# Patient Record
Sex: FEMALE | Race: BLACK OR AFRICAN AMERICAN | NOT HISPANIC OR LATINO | Employment: FULL TIME | URBAN - METROPOLITAN AREA
[De-identification: names, ages, dates, MRNs, and addresses within clinical notes are randomized per-mention and may not be internally consistent; named-entity substitution may affect disease eponyms.]

---

## 2017-01-14 ENCOUNTER — HOSPITAL ENCOUNTER (EMERGENCY)
Facility: HOSPITAL | Age: 22
Discharge: HOME | End: 2017-01-14
Attending: EMERGENCY MEDICINE
Payer: MEDICAID

## 2017-01-14 VITALS
BODY MASS INDEX: 47.09 KG/M2 | TEMPERATURE: 98 F | OXYGEN SATURATION: 98 % | DIASTOLIC BLOOD PRESSURE: 65 MMHG | RESPIRATION RATE: 16 BRPM | SYSTOLIC BLOOD PRESSURE: 115 MMHG | HEART RATE: 89 BPM | HEIGHT: 66 IN | WEIGHT: 293 LBS

## 2017-01-14 DIAGNOSIS — K80.20 CALCULUS OF GALLBLADDER WITHOUT CHOLECYSTITIS WITHOUT OBSTRUCTION: Primary | ICD-10-CM

## 2017-01-14 LAB
ALBUMIN SERPL BCP-MCNC: 3.7 G/DL
ALP SERPL-CCNC: 150 U/L
ALT SERPL W/O P-5'-P-CCNC: 42 U/L
ANION GAP SERPL CALC-SCNC: 12 MMOL/L
AST SERPL-CCNC: 164 U/L
B-HCG UR QL: NEGATIVE
BASOPHILS # BLD AUTO: 0.02 K/UL
BASOPHILS NFR BLD: 0.1 %
BILIRUB SERPL-MCNC: 0.4 MG/DL
BILIRUB UR QL STRIP: NEGATIVE
BUN SERPL-MCNC: 7 MG/DL
CALCIUM SERPL-MCNC: 9.6 MG/DL
CHLORIDE SERPL-SCNC: 104 MMOL/L
CLARITY UR: CLEAR
CO2 SERPL-SCNC: 20 MMOL/L
COLOR UR: YELLOW
CREAT SERPL-MCNC: 0.7 MG/DL
CTP QC/QA: YES
DIFFERENTIAL METHOD: ABNORMAL
EOSINOPHIL # BLD AUTO: 0 K/UL
EOSINOPHIL NFR BLD: 0.1 %
ERYTHROCYTE [DISTWIDTH] IN BLOOD BY AUTOMATED COUNT: 16.5 %
EST. GFR  (AFRICAN AMERICAN): >60 ML/MIN/1.73 M^2
EST. GFR  (NON AFRICAN AMERICAN): >60 ML/MIN/1.73 M^2
GLUCOSE SERPL-MCNC: 142 MG/DL
GLUCOSE UR QL STRIP: NEGATIVE
HCT VFR BLD AUTO: 35 %
HGB BLD-MCNC: 11.2 G/DL
HGB UR QL STRIP: NEGATIVE
KETONES UR QL STRIP: NEGATIVE
LEUKOCYTE ESTERASE UR QL STRIP: NEGATIVE
LIPASE SERPL-CCNC: 23 U/L
LYMPHOCYTES # BLD AUTO: 1.4 K/UL
LYMPHOCYTES NFR BLD: 9.4 %
MCH RBC QN AUTO: 23.6 PG
MCHC RBC AUTO-ENTMCNC: 32 %
MCV RBC AUTO: 74 FL
MONOCYTES # BLD AUTO: 1 K/UL
MONOCYTES NFR BLD: 6.6 %
NEUTROPHILS # BLD AUTO: 12.1 K/UL
NEUTROPHILS NFR BLD: 83.8 %
NITRITE UR QL STRIP: NEGATIVE
PH UR STRIP: 6 [PH] (ref 5–8)
PLATELET # BLD AUTO: 478 K/UL
PMV BLD AUTO: 11 FL
POTASSIUM SERPL-SCNC: 3.5 MMOL/L
PROT SERPL-MCNC: 7.6 G/DL
PROT UR QL STRIP: NEGATIVE
RBC # BLD AUTO: 4.74 M/UL
SODIUM SERPL-SCNC: 136 MMOL/L
SP GR UR STRIP: 1.02 (ref 1–1.03)
URN SPEC COLLECT METH UR: NORMAL
UROBILINOGEN UR STRIP-ACNC: NEGATIVE EU/DL
WBC # BLD AUTO: 14.5 K/UL

## 2017-01-14 PROCEDURE — 96361 HYDRATE IV INFUSION ADD-ON: CPT

## 2017-01-14 PROCEDURE — 25000003 PHARM REV CODE 250: Performed by: EMERGENCY MEDICINE

## 2017-01-14 PROCEDURE — 63600175 PHARM REV CODE 636 W HCPCS: Performed by: EMERGENCY MEDICINE

## 2017-01-14 PROCEDURE — 81025 URINE PREGNANCY TEST: CPT | Performed by: EMERGENCY MEDICINE

## 2017-01-14 PROCEDURE — 81003 URINALYSIS AUTO W/O SCOPE: CPT

## 2017-01-14 PROCEDURE — 99285 EMERGENCY DEPT VISIT HI MDM: CPT | Mod: 25

## 2017-01-14 PROCEDURE — 96374 THER/PROPH/DIAG INJ IV PUSH: CPT

## 2017-01-14 PROCEDURE — 83690 ASSAY OF LIPASE: CPT

## 2017-01-14 PROCEDURE — 85025 COMPLETE CBC W/AUTO DIFF WBC: CPT

## 2017-01-14 PROCEDURE — 96375 TX/PRO/DX INJ NEW DRUG ADDON: CPT

## 2017-01-14 PROCEDURE — 80053 COMPREHEN METABOLIC PANEL: CPT

## 2017-01-14 RX ORDER — FERROUS GLUCONATE 324(38)MG
324 TABLET ORAL
COMMUNITY
End: 2017-12-10

## 2017-01-14 RX ORDER — MORPHINE SULFATE 2 MG/ML
6 INJECTION, SOLUTION INTRAMUSCULAR; INTRAVENOUS
Status: COMPLETED | OUTPATIENT
Start: 2017-01-14 | End: 2017-01-14

## 2017-01-14 RX ORDER — ONDANSETRON 4 MG/1
8 TABLET, FILM COATED ORAL EVERY 8 HOURS PRN
Qty: 14 TABLET | Refills: 0 | Status: SHIPPED | OUTPATIENT
Start: 2017-01-14 | End: 2017-12-10

## 2017-01-14 RX ORDER — ONDANSETRON 2 MG/ML
4 INJECTION INTRAMUSCULAR; INTRAVENOUS
Status: COMPLETED | OUTPATIENT
Start: 2017-01-14 | End: 2017-01-14

## 2017-01-14 RX ORDER — HYDROCODONE BITARTRATE AND ACETAMINOPHEN 10; 325 MG/1; MG/1
1 TABLET ORAL EVERY 4 HOURS PRN
Qty: 18 TABLET | Refills: 0 | Status: SHIPPED | OUTPATIENT
Start: 2017-01-14 | End: 2017-12-10

## 2017-01-14 RX ORDER — METFORMIN HYDROCHLORIDE 500 MG/1
500 TABLET ORAL DAILY
COMMUNITY
End: 2018-03-15

## 2017-01-14 RX ADMIN — ONDANSETRON 4 MG: 2 INJECTION, SOLUTION INTRAMUSCULAR; INTRAVENOUS at 04:01

## 2017-01-14 RX ADMIN — MORPHINE SULFATE 6 MG: 2 INJECTION, SOLUTION INTRAMUSCULAR; INTRAVENOUS at 04:01

## 2017-01-14 RX ADMIN — SODIUM CHLORIDE 1000 ML: 0.9 INJECTION, SOLUTION INTRAVENOUS at 04:01

## 2017-01-14 NOTE — ED NOTES
Pt here with RUQ abd pain similar to previous gallstone pain she has had in the past-+nausea,denies vomiting or diarrhea or fever. Skin WDL,resp are regular and unlabored. Pain ia 8/10--described as grabbing-slightly worse with palpation.

## 2017-01-14 NOTE — ED PROVIDER NOTES
Encounter Date: 1/14/2017       History     Chief Complaint   Patient presents with    Abdominal Pain     Patient presents to the ED via EMS with complaints of having abdominal pain with nausea. Denies any fever, vomiting, or diarrhea. States having been diagnosed with gallstones.      Review of patient's allergies indicates:   Allergen Reactions    Grass pollen-june grass standard      HPI Comments: 21-year-old woman history of gallstones.  Comes ER today with epigastric and right upper quadrant pain that began earlier this evening.  Patient also reports nausea. Pain is sharp and rated as a 10/10.  Patient was hospitalized approximately 3 weeks ago for similar symptoms she had an ultrasounds that showed gallstones but no wall thickening or pericholecystic fluid.    The history is provided by the patient.     Past Medical History   Diagnosis Date    Asthma      No past medical history pertinent negatives.  History reviewed. No pertinent past surgical history.  History reviewed. No pertinent family history.  Social History   Substance Use Topics    Smoking status: Unknown If Ever Smoked    Smokeless tobacco: None    Alcohol use None     Review of Systems   Constitutional: Negative for fever.   HENT: Negative for sore throat.    Respiratory: Negative for shortness of breath.    Cardiovascular: Negative for chest pain.   Gastrointestinal: Positive for abdominal pain and nausea. Negative for abdominal distention, diarrhea and vomiting.   Genitourinary: Negative for dysuria.   Musculoskeletal: Negative for back pain.   Skin: Negative for color change and rash.   Neurological: Negative for weakness.   Hematological: Does not bruise/bleed easily.   All other systems reviewed and are negative.      Physical Exam   Initial Vitals   BP Pulse Resp Temp SpO2   01/14/17 0131 01/14/17 0131 01/14/17 0131 01/14/17 0131 01/14/17 0131   129/74 108 20 98.1 °F (36.7 °C) 100 %     Physical Exam    Nursing note and vitals  reviewed.  Constitutional: Vital signs are normal. She appears well-developed and well-nourished. She is not diaphoretic. She appears distressed.   HENT:   Head: Normocephalic and atraumatic.   Eyes: Conjunctivae and EOM are normal. Pupils are equal, round, and reactive to light.   Neck: Normal range of motion. Neck supple.   Cardiovascular: Normal rate, regular rhythm and normal heart sounds.   Pulmonary/Chest: Breath sounds normal. No respiratory distress. She has no wheezes. She has no rhonchi. She has no rales.   Abdominal: Soft. She exhibits no distension. There is tenderness. There is no rebound and no guarding.   Musculoskeletal: Normal range of motion. She exhibits no edema or tenderness.   Neurological: She is alert and oriented to person, place, and time.   Skin: Skin is warm and dry.   Psychiatric: She has a normal mood and affect.         ED Course   Procedures  Labs Reviewed   CBC W/ AUTO DIFFERENTIAL - Abnormal; Notable for the following:        Result Value    WBC 14.50 (*)     Hemoglobin 11.2 (*)     Hematocrit 35.0 (*)     MCV 74 (*)     MCH 23.6 (*)     RDW 16.5 (*)     Platelets 478 (*)     Gran # 12.1 (*)     Gran% 83.8 (*)     Lymph% 9.4 (*)     All other components within normal limits   COMPREHENSIVE METABOLIC PANEL - Abnormal; Notable for the following:     CO2 20 (*)     Glucose 142 (*)     Alkaline Phosphatase 150 (*)      (*)     All other components within normal limits   LIPASE   URINALYSIS   POCT URINE PREGNANCY                          Attending Attestation:             Attending ED Notes:   Discussed with Dr. Wagner who recommended repeat ultrasound.           ED Course     Clinical Impression:   The encounter diagnosis was Calculus of gallbladder without cholecystitis without obstruction.          Rick Hogue MD  01/14/17 5961

## 2017-01-14 NOTE — ED AVS SNAPSHOT
[facility]  [address]  [place] [address]               Melissa Herrera   2017  1:57 AM   ED    Description:  Female : 1995   Department:  [facility]           Your Care was Coordinated By:     Provider Role From To    Rick Hogue MD Attending Provider 17 0358 --      Reason for Visit     Abdominal Pain           Diagnoses this Visit        Comments    Calculus of gallbladder without cholecystitis without obstruction    -  Primary       ED Disposition     None           To Do List           Follow-up Information     Follow up with Mason Wagner MD On 2017.    Specialties:  General Surgery, Surgery    Contact information:    [address] [facility] [address]  [address]  [facility] [address]  [phone]          Follow up with [facility]. Call on 2017.    Specialties:  Neurosurgery, Plastic Surgery, Podiatry, Surgical Oncology, Allergy, Cardiothoracic Surgery, Otolaryngology, Gastroenterology, Breast Surgery, Oral Surgery, Oral and Maxillofacial Surgery, Cardiology, Bariatrics, Internal Medicine, Family Medicine, Colon and Rectal Surgery, Dental General Practice, Gynecology, Orthopedic Surgery, Genetics, Endocrinology, Vascular Surgery, Physical Medicine and Rehabilitation, Urology    Contact information:     [facility] [address]  [phone]         These Medications        Disp Refills Start End    hydrocodone-acetaminophen 10-325mg (NORCO)  mg Tab 18 tablet 0 2017     Take 1 tablet by mouth every 4 (four) hours as needed for Pain. - Oral    ondansetron (ZOFRAN) 4 MG tablet 14 tablet 0 2017     Take 2 tablets (8 mg total) by mouth every 8 (eight) hours as needed for Nausea. - Oral      [facility]     [facility] Nurse Care Line -  Assistance  Registered nurses in the [facility] provide clinical advisement, health education, appointment  "booking, and other advisory services.  Call for this free service at [phone].             Medications           START taking these NEW medications        Refills    hydrocodone-acetaminophen 10-325mg (NORCO)  mg Tab 0    Sig: Take 1 tablet by mouth every 4 (four) hours as needed for Pain.    Class: Print    Route: Oral    ondansetron (ZOFRAN) 4 MG tablet 0    Sig: Take 2 tablets (8 mg total) by mouth every 8 (eight) hours as needed for Nausea.    Class: Print    Route: Oral      These medications were administered today        Dose Freq    sodium chloride 0.9% bolus 1,000 mL 1,000 mL Once    Sig: Inject 1,000 mLs into the vein once.    Class: Normal    Route: Intravenous    ondansetron injection 4 mg 4 mg ED 1 Time    Sig: Inject 4 mg into the vein ED 1 Time.    Class: Normal    Route: Intravenous    morphine injection 6 mg 6 mg ED 1 Time    Sig: Inject 3 mLs (6 mg total) into the vein ED 1 Time.    Class: Normal    Route: Intravenous           Verify that the below list of medications is an accurate representation of the medications you are currently taking.  If none reported, the list may be blank. If incorrect, please contact your healthcare provider. Carry this list with you in case of emergency.           Current Medications     ferrous gluconate (FERGON) 324 MG tablet Take 324 mg by mouth daily with breakfast.    metformin (GLUCOPHAGE) 500 MG tablet Take 500 mg by mouth once daily. Pt did not bring med bottles with her.    hydrocodone-acetaminophen 10-325mg (NORCO)  mg Tab Take 1 tablet by mouth every 4 (four) hours as needed for Pain.    ondansetron (ZOFRAN) 4 MG tablet Take 2 tablets (8 mg total) by mouth every 8 (eight) hours as needed for Nausea.           Clinical Reference Information           Your Vitals Were     BP Pulse Temp Resp Height Weight    118/60 97 97.5 °F (36.4 °C) (Oral) 18 5' 6" (1.676 m) 137.9 kg (304 lb)    SpO2 BMI             100% 49.07 kg/m2         Allergies as of " 1/14/2017        Reactions    Grass Pollen-june Grass Standard       Immunizations Administered on Date of Encounter - 1/14/2017     None      ED Micro, Lab, POCT     Start Ordered       Status Ordering Provider    01/14/17 0359 01/14/17 0359  CBC W/ AUTO DIFFERENTIAL  Once      Final result     01/14/17 0359 01/14/17 0359  Comp. Metabolic Panel  STAT      Final result     01/14/17 0359 01/14/17 0359  Lipase  STAT      Final result     01/14/17 0359 01/14/17 0359  Urinalysis - Clean Catch  STAT      Final result     01/14/17 0359 01/14/17 0359  POCT urine pregnancy  Once      Final result       ED Imaging Orders     Start Ordered       Status Ordering Provider    01/14/17 0656 01/14/17 0655  US Abdomen Limited  1 time imaging      Final result         Discharge Instructions         CALL THE GENERAL SURGERY CLINIC AT [facility] OR DR. PALMA TO SCHEDULE AN APPOINTMENT FOR EVALUATION OF GALL STONES.      RETURN TO THE ED IMMEDIATELY WITH ANY WORSENING OF PAIN, FEVER, OR VOMITING    Gallstones with Biliary Colic    You have abdominal pain due to irritation and spasm of the gallbladder. This is called biliary colic. The gallbladder is a small sac under the liver, which stores and releases a fluid that aids in the digestion of fat. A collection of crystals may form stones inside the gallbladder (gallstones). Gallstones can cause the gallbladder to spasm. If they block the duct out of the gallbladder, they can cause pain and even an infection.   A number of factors increase the risk for having gallstones:  · Being female  · Being severely overweight (obese)  · Older age  · Losing or gaining weight quickly  · Eating a high-calorie diet  · Being pregnant  · Taking hormone therapy  · Having diabetes  Home care  · Rest in bed.  · Drink only clear liquids until you feel better.  · You may have been prescribed medicine for pain or nausea. Take these as directed.  · Fat in your diet makes the gallbladder contract and may cause  increased pain. Avoid foods that are high in fat (such as full-fat dairy, fried foods, and fatty meats) for at least two days.  · If you are overweight, talk to your healthcare provider about losing weight.  Follow-up care  Follow up with your healthcare provider or as advised. There is a chance that you will have another episode of pain from your gallstones at some point. Removal of the gallbladder is an option to prevent this. Talk with your healthcare provider about your treatment options.  When to seek medical advice  Call your healthcare provider if any of the following occur:  · Worsening pain or pain lasting for longer than 6 hours  · Pain moving to the right lower abdomen  · Repeated vomiting  · Swollen abdomen  · Fever of 100.4ºF (38ºC) or higher, or as directed by your healthcare provider  · Very dark urine, light colored stools, or yellow color of the skin or eyes  · Chest, arm, back, neck or jaw pain  © 4285-2859 [employer]. [address] [place] [address]. All rights reserved. This information is not intended as a substitute for professional medical care. Always follow your healthcare professional's instructions.          [facility] Sign-Up     Activating your [facility] account is as easy as 1-2-3!     1) Visit [email or web address], select Sign Up Now, enter this activation code and your date of birth, then select Next.  [email or web address]  Expires: 2/8/2017 10:52 AM      2) Create a username and password to use when you visit [facility] in the future and select a security question in case you lose your password and select Next.    3) Enter your e-mail address and click Sign Up!    Additional Information  If you have questions, please e-mail [email or web address] or call [phone] to talk to our [facility] staff. Remember, [facility] is NOT to be used for urgent needs. For medical emergencies, dial 911.         Smoking Cessation     If you would like to quit smoking:   You may be  eligible for free services if you are a [place] resident and started smoking cigarettes before September 1, 1988.  Call the Smoking Cessation Trust (SCT) toll free at (004) 788-6177 or (624) 218-7744.   Call 3-412-QUIT-NOW if you do not meet the above criteria.             [facility] complies with applicable Federal civil rights laws and does not discriminate on the basis of race, color, national origin, age, disability, or sex.        Language Assistance Services     ATTENTION: Language assistance services are available, free of charge. Please call [phone].      ATENCIÓN: Si habla español, tiene a andrade disposición servicios gratuitos de asistencia lingüística. Llame al [phone].     CHÚ Ý: N?u b?n nói Ti?ng Vi?t, có các d?ch v? h? tr? ngôn ng? mi?n phí dành cho b?n. G?i s? [phone].

## 2017-01-14 NOTE — DISCHARGE INSTRUCTIONS
CALL THE GENERAL SURGERY CLINIC AT [facility] OR DR. PALMA TO SCHEDULE AN APPOINTMENT FOR EVALUATION OF GALL STONES.      RETURN TO THE ED IMMEDIATELY WITH ANY WORSENING OF PAIN, FEVER, OR VOMITING    Gallstones with Biliary Colic    You have abdominal pain due to irritation and spasm of the gallbladder. This is called biliary colic. The gallbladder is a small sac under the liver, which stores and releases a fluid that aids in the digestion of fat. A collection of crystals may form stones inside the gallbladder (gallstones). Gallstones can cause the gallbladder to spasm. If they block the duct out of the gallbladder, they can cause pain and even an infection.   A number of factors increase the risk for having gallstones:  · Being female  · Being severely overweight (obese)  · Older age  · Losing or gaining weight quickly  · Eating a high-calorie diet  · Being pregnant  · Taking hormone therapy  · Having diabetes  Home care  · Rest in bed.  · Drink only clear liquids until you feel better.  · You may have been prescribed medicine for pain or nausea. Take these as directed.  · Fat in your diet makes the gallbladder contract and may cause increased pain. Avoid foods that are high in fat (such as full-fat dairy, fried foods, and fatty meats) for at least two days.  · If you are overweight, talk to your healthcare provider about losing weight.  Follow-up care  Follow up with your healthcare provider or as advised. There is a chance that you will have another episode of pain from your gallstones at some point. Removal of the gallbladder is an option to prevent this. Talk with your healthcare provider about your treatment options.  When to seek medical advice  Call your healthcare provider if any of the following occur:  · Worsening pain or pain lasting for longer than 6 hours  · Pain moving to the right lower abdomen  · Repeated vomiting  · Swollen abdomen  · Fever of 100.4ºF (38ºC) or higher, or as directed by your  healthcare provider  · Very dark urine, light colored stools, or yellow color of the skin or eyes  · Chest, arm, back, neck or jaw pain  © 3230-9227 [employer]. [address] [place] [address], [place] [address]. All rights reserved. This information is not intended as a substitute for professional medical care. Always follow your healthcare professional's instructions.

## 2017-03-05 ENCOUNTER — HOSPITAL ENCOUNTER (EMERGENCY)
Facility: HOSPITAL | Age: 22
Discharge: HOME | End: 2017-03-05
Attending: EMERGENCY MEDICINE
Payer: MEDICAID

## 2017-03-05 VITALS
WEIGHT: 293 LBS | DIASTOLIC BLOOD PRESSURE: 56 MMHG | HEART RATE: 89 BPM | BODY MASS INDEX: 47.09 KG/M2 | SYSTOLIC BLOOD PRESSURE: 119 MMHG | HEIGHT: 66 IN | TEMPERATURE: 98 F | OXYGEN SATURATION: 100 % | RESPIRATION RATE: 18 BRPM

## 2017-03-05 DIAGNOSIS — E66.9 OBESITY, UNSPECIFIED OBESITY SEVERITY, UNSPECIFIED OBESITY TYPE: Primary | ICD-10-CM

## 2017-03-05 DIAGNOSIS — K80.50 BILIARY COLIC: ICD-10-CM

## 2017-03-05 LAB
ALBUMIN SERPL BCP-MCNC: 3.6 G/DL
ALP SERPL-CCNC: 133 U/L
ALT SERPL W/O P-5'-P-CCNC: 12 U/L
AMYLASE SERPL-CCNC: 53 U/L
ANION GAP SERPL CALC-SCNC: 11 MMOL/L
AST SERPL-CCNC: 27 U/L
B-HCG UR QL: NEGATIVE
BASOPHILS # BLD AUTO: 0.01 K/UL
BASOPHILS NFR BLD: 0.1 %
BILIRUB SERPL-MCNC: 0.2 MG/DL
BUN SERPL-MCNC: 10 MG/DL
CALCIUM SERPL-MCNC: 9.1 MG/DL
CHLORIDE SERPL-SCNC: 104 MMOL/L
CO2 SERPL-SCNC: 24 MMOL/L
CREAT SERPL-MCNC: 0.8 MG/DL
CTP QC/QA: YES
DIFFERENTIAL METHOD: ABNORMAL
EOSINOPHIL # BLD AUTO: 0.1 K/UL
EOSINOPHIL NFR BLD: 0.9 %
ERYTHROCYTE [DISTWIDTH] IN BLOOD BY AUTOMATED COUNT: 16.7 %
EST. GFR  (AFRICAN AMERICAN): >60 ML/MIN/1.73 M^2
EST. GFR  (NON AFRICAN AMERICAN): >60 ML/MIN/1.73 M^2
GLUCOSE SERPL-MCNC: 97 MG/DL
HCT VFR BLD AUTO: 48.2 %
HGB BLD-MCNC: 14.6 G/DL
LIPASE SERPL-CCNC: 21 U/L
LYMPHOCYTES # BLD AUTO: 1.3 K/UL
LYMPHOCYTES NFR BLD: 14 %
MCH RBC QN AUTO: 23.2 PG
MCHC RBC AUTO-ENTMCNC: 30.3 %
MCV RBC AUTO: 77 FL
MONOCYTES # BLD AUTO: 0.4 K/UL
MONOCYTES NFR BLD: 4.9 %
NEUTROPHILS # BLD AUTO: 7.1 K/UL
NEUTROPHILS NFR BLD: 80 %
PLATELET # BLD AUTO: 334 K/UL
PMV BLD AUTO: 10.2 FL
POTASSIUM SERPL-SCNC: 3.9 MMOL/L
PROT SERPL-MCNC: 8.2 G/DL
RBC # BLD AUTO: 6.28 M/UL
SODIUM SERPL-SCNC: 139 MMOL/L
WBC # BLD AUTO: 8.9 K/UL

## 2017-03-05 PROCEDURE — 85025 COMPLETE CBC W/AUTO DIFF WBC: CPT

## 2017-03-05 PROCEDURE — 80053 COMPREHEN METABOLIC PANEL: CPT

## 2017-03-05 PROCEDURE — 25000003 PHARM REV CODE 250: Performed by: PHYSICIAN ASSISTANT

## 2017-03-05 PROCEDURE — 82150 ASSAY OF AMYLASE: CPT

## 2017-03-05 PROCEDURE — 83690 ASSAY OF LIPASE: CPT

## 2017-03-05 PROCEDURE — 63600175 PHARM REV CODE 636 W HCPCS: Performed by: PHYSICIAN ASSISTANT

## 2017-03-05 PROCEDURE — 96372 THER/PROPH/DIAG INJ SC/IM: CPT

## 2017-03-05 PROCEDURE — 99284 EMERGENCY DEPT VISIT MOD MDM: CPT | Mod: 25

## 2017-03-05 RX ORDER — ONDANSETRON 4 MG/1
4 TABLET, ORALLY DISINTEGRATING ORAL
Status: COMPLETED | OUTPATIENT
Start: 2017-03-05 | End: 2017-03-05

## 2017-03-05 RX ORDER — DICYCLOMINE HYDROCHLORIDE 20 MG/1
20 TABLET ORAL 2 TIMES DAILY
Qty: 20 TABLET | Refills: 0 | Status: SHIPPED | OUTPATIENT
Start: 2017-03-05 | End: 2017-04-04

## 2017-03-05 RX ORDER — DICYCLOMINE HYDROCHLORIDE 10 MG/ML
10 INJECTION INTRAMUSCULAR
Status: COMPLETED | OUTPATIENT
Start: 2017-03-05 | End: 2017-03-05

## 2017-03-05 RX ORDER — ONDANSETRON 4 MG/1
4 TABLET, ORALLY DISINTEGRATING ORAL EVERY 6 HOURS PRN
Qty: 15 TABLET | Refills: 0 | Status: SHIPPED | OUTPATIENT
Start: 2017-03-05 | End: 2017-12-10

## 2017-03-05 RX ADMIN — LIDOCAINE HYDROCHLORIDE: 20 SOLUTION ORAL; TOPICAL at 03:03

## 2017-03-05 RX ADMIN — ONDANSETRON 4 MG: 4 TABLET, ORALLY DISINTEGRATING ORAL at 03:03

## 2017-03-05 RX ADMIN — DICYCLOMINE HYDROCHLORIDE 10 MG: 20 INJECTION, SOLUTION INTRAMUSCULAR at 06:03

## 2017-03-05 NOTE — ED PROVIDER NOTES
"Encounter Date: 3/5/2017       History     Chief Complaint   Patient presents with    Abdominal Pain     epigastric pain x 20 min. denies n/v/d. cbg 96 per EMS     Review of patient's allergies indicates:   Allergen Reactions    Grass pollen-june grass standard      HPI Comments: Melissa Herrera, a 22 y.o. female with PMH of calculi of gallbladder that presents to the ED for epigastric abdominal pain that started about 30 mins PTA with one episode of vomiting and is currently nauseated, which is contrary to triage note.  She states that she ate french fries yesterday and today and then the pain in her abdomen started.  Denies any diarrhea, fever.  The pain is similar in nature to when she had "gallbladder attacks" in the past.  She describes it as a spasm and worse with certain movements.  No treatments tried.        The history is provided by the patient.     Past Medical History:   Diagnosis Date    Asthma     Diabetes mellitus      History reviewed. No pertinent surgical history.  History reviewed. No pertinent family history.  Social History   Substance Use Topics    Smoking status: Never Smoker    Smokeless tobacco: None    Alcohol use No     Review of Systems   Constitutional: Negative for fever.   Gastrointestinal: Positive for abdominal distention, abdominal pain, nausea and vomiting. Negative for blood in stool, constipation and diarrhea.   Genitourinary: Negative for dysuria.   Musculoskeletal: Negative for back pain.   Skin: Negative for color change and rash.   Allergic/Immunologic: Negative for immunocompromised state.   Neurological: Negative for dizziness and headaches.   Hematological: Does not bruise/bleed easily.   All other systems reviewed and are negative.      Physical Exam   Initial Vitals   BP Pulse Resp Temp SpO2   03/05/17 1410 03/05/17 1410 03/05/17 1410 03/05/17 1410 03/05/17 1410   118/74 90 16 97.6 °F (36.4 °C) 99 %     Physical Exam    Nursing note and vitals " reviewed.  Constitutional: She appears well-developed and well-nourished. No distress.   HENT:   Head: Normocephalic and atraumatic.   Right Ear: External ear normal.   Left Ear: External ear normal.   Nose: Nose normal.   Mouth/Throat: Oropharynx is clear and moist.   Eyes: Conjunctivae and EOM are normal.   Neck: Normal range of motion. No tracheal deviation present.   Cardiovascular: Normal rate and regular rhythm.   Pulmonary/Chest: Breath sounds normal.   Abdominal: Soft. Bowel sounds are normal. She exhibits no distension. There is tenderness (mild) in the epigastric area. There is no rigidity, no rebound, no guarding, no tenderness at McBurney's point and negative Acosta's sign.   Musculoskeletal: Normal range of motion. She exhibits no tenderness.   Neurological: She is alert and oriented to person, place, and time. She has normal strength.   Skin: Skin is warm and dry. No rash noted. No erythema.   Psychiatric: She has a normal mood and affect. Thought content normal.         ED Course   Procedures  Labs Reviewed   CBC W/ AUTO DIFFERENTIAL - Abnormal; Notable for the following:        Result Value    RBC 6.28 (*)     MCV 77 (*)     MCH 23.2 (*)     MCHC 30.3 (*)     RDW 16.7 (*)     Gran% 80.0 (*)     Lymph% 14.0 (*)     All other components within normal limits   COMPREHENSIVE METABOLIC PANEL   LIPASE   AMYLASE             Medical Decision Making:   Initial Assessment:   Abdominal pain  Differential Diagnosis:   Biliary colic, cholecystitis, bile duct obstruction   ED Management:  Symptoms and exam most consistent with biliary colic.  Zofran, GI cocktail and bentyl given in ED with improvement of symptoms.  Instructed patient to f/u with surgeon for possible gallbladder removal.  Emphasized the need to abstain from eating fatty/fried foods.  Strict return precautions given and patient verbalized understanding.    RX: Bentyl, Zofran                    ED Course     Clinical Impression:   The primary  encounter diagnosis was Obesity, unspecified obesity severity, unspecified obesity type. A diagnosis of Biliary colic was also pertinent to this visit.          Ernestina Mckee PA-C  03/05/17 1376

## 2017-03-05 NOTE — ED NOTES
Epigastric pain and nausea that began approx 30 min pta.  Pt with 1 episode of vomiting at this time, nausea improving after vomiting.  Pt states the only thing she has eaten today was french fries around 12 pm.  Denies pain with bowel movement, denies loose stool, denies blood in stool

## 2017-03-05 NOTE — ED AVS SNAPSHOT
[facility]  [address]  [place] [address]               Melissa Herrera   3/5/2017  2:43 PM   ED    Description:  Female : 1995   Department:  [facility]           Your Care was Coordinated By:     Provider Role From To    Agustín Strong Jr., MD Attending Provider [id number] --    Ernestina Mckee PA-C Physician Assistant [id number] --      Reason for Visit     Abdominal Pain           Diagnoses this Visit        Comments    Obesity, unspecified obesity severity, unspecified obesity type    -  Primary     Biliary colic           ED Disposition     None           To Do List           Follow-up Information     Follow up with Mason Wagner MD In 1 day.    Specialties:  General Surgery, Surgery    Contact information:    [address]  [address]  [place] [address]  [phone]         These Medications        Disp Refills Start End    dicyclomine (BENTYL) 20 mg tablet 20 tablet 0 3/5/2017 2017    Take 1 tablet (20 mg total) by mouth 2 (two) times daily. - Oral    ondansetron (ZOFRAN-ODT) 4 MG TbDL 15 tablet 0 3/5/2017     Take 1 tablet (4 mg total) by mouth every 6 (six) hours as needed. - Oral      [facility]     [facility] -  Assistance  Registered nurses in the [facility] provide clinical advisement, health education, appointment booking, and other advisory services.  Call for this free service at [phone].             Medications           START taking these NEW medications        Refills    dicyclomine (BENTYL) 20 mg tablet 0    Sig: Take 1 tablet (20 mg total) by mouth 2 (two) times daily.    Class: Print    Route: Oral    ondansetron (ZOFRAN-ODT) 4 MG TbDL 0    Sig: Take 1 tablet (4 mg total) by mouth every 6 (six) hours as needed.    Class: Print    Route: Oral      These medications were administered today        Dose Freq    (pyxis) gi cocktail (mylanta 30 mL,  "lidocaine 2 % viscous 10 mL, dicyclomine 10 mL) 50 mL  ED 1 Time    Sig: Take by mouth ED 1 Time.    Class: Normal    Route: Oral    Cosign for Ordering: Required by Agustín Strong Jr., MD    ondansetron disintegrating tablet 4 mg 4 mg ED 1 Time    Sig: Take 1 tablet (4 mg total) by mouth ED 1 Time.    Class: Normal    Route: Oral    Cosign for Ordering: Required by Agustín Strong Jr., MD    dicyclomine injection 10 mg 10 mg ED 1 Time    Sig: Inject 1 mL (10 mg total) into the muscle ED 1 Time.    Class: Normal    Route: Intramuscular    Cosign for Ordering: Required by Agustín Strong Jr., MD           Verify that the below list of medications is an accurate representation of the medications you are currently taking.  If none reported, the list may be blank. If incorrect, please contact your healthcare provider. Carry this list with you in case of emergency.           Current Medications     ferrous gluconate (FERGON) 324 MG tablet Take 324 mg by mouth daily with breakfast.    metformin (GLUCOPHAGE) 500 MG tablet Take 500 mg by mouth once daily. Pt did not bring med bottles with her.    dicyclomine (BENTYL) 20 mg tablet Take 1 tablet (20 mg total) by mouth 2 (two) times daily.    hydrocodone-acetaminophen 10-325mg (NORCO)  mg Tab Take 1 tablet by mouth every 4 (four) hours as needed for Pain.    ondansetron (ZOFRAN) 4 MG tablet Take 2 tablets (8 mg total) by mouth every 8 (eight) hours as needed for Nausea.    ondansetron (ZOFRAN-ODT) 4 MG TbDL Take 1 tablet (4 mg total) by mouth every 6 (six) hours as needed.           Clinical Reference Information           Your Vitals Were     BP Pulse Temp Resp Height Weight    139/79 (BP Location: Right arm, Patient Position: Sitting, BP Method: Automatic) 92 97.7 °F (36.5 °C) (Oral) 18 5' 6" (1.676 m) 140.6 kg (310 lb)    Last Period SpO2 BMI          02/25/2017 99% 50.04 kg/m2        Allergies as of 3/5/2017        Reactions    Grass Pollen-june Grass Standard     "   Immunizations Administered on Date of Encounter - 3/5/2017     None      ED Micro, Lab, POCT     Start Ordered       Status Ordering Provider    03/05/17 1546 03/05/17 1546  CBC auto differential  STAT      Final result     03/05/17 1546 03/05/17 1546  Comprehensive metabolic panel  STAT      Final result     03/05/17 1546 03/05/17 1546  Lipase  STAT      Final result     03/05/17 1546 03/05/17 1546  Amylase  STAT      Final result     03/05/17 0000 03/05/17 1451  POCT urine pregnancy     Comments:  This order was created through External Result Entry    Completed       ED Imaging Orders     None      Discharge References/Attachments     CHOLECYSTITIS (PRESUMED) (ENGLISH)    GALLSTONES WITH BILIARY COLIC (CONFIRMED) (ENGLISH)      [facility] Sign-Up     Activating your [facility] account is as easy as 1-2-3!     1) Visit [email or web address], select Sign Up Now, enter this activation code and your date of birth, then select Next.  [email or web address]  Expires: 4/19/2017  6:48 PM      2) Create a username and password to use when you visit [facility] in the future and select a security question in case you lose your password and select Next.    3) Enter your e-mail address and click Sign Up!    Additional Information  If you have questions, please e-mail [email or web address] or call [phone] to talk to our [facility] staff. Remember, [facility] is NOT to be used for urgent needs. For medical emergencies, dial 911.          [facility] complies with applicable Federal civil rights laws and does not discriminate on the basis of race, color, national origin, age, disability, or sex.        Language Assistance Services     ATTENTION: Language assistance services are available, free of charge. Please call [phone].      ATENCIÓN: Si habla español, tiene a andrade disposición servicios gratuitos de asistencia lingüística. Llame al [phone].     CHÚ Ý: N?u b?n nói Ti?ng Vi?t, có các d?ch v? h? tr? ngôn  ng? mi?n phí dành cho b?n. G?i s? [phone].

## 2017-12-10 ENCOUNTER — HOSPITAL ENCOUNTER (EMERGENCY)
Facility: HOSPITAL | Age: 22
Discharge: HOME | End: 2017-12-10
Attending: EMERGENCY MEDICINE
Payer: MEDICAID

## 2017-12-10 VITALS
SYSTOLIC BLOOD PRESSURE: 134 MMHG | WEIGHT: 293 LBS | DIASTOLIC BLOOD PRESSURE: 71 MMHG | HEIGHT: 67 IN | RESPIRATION RATE: 18 BRPM | OXYGEN SATURATION: 100 % | HEART RATE: 67 BPM | BODY MASS INDEX: 45.99 KG/M2 | TEMPERATURE: 98 F

## 2017-12-10 DIAGNOSIS — K80.50 BILIARY COLIC: Primary | ICD-10-CM

## 2017-12-10 DIAGNOSIS — R10.9 ABDOMINAL PAIN: ICD-10-CM

## 2017-12-10 DIAGNOSIS — K80.20 CALCULUS OF GALLBLADDER WITHOUT CHOLECYSTITIS WITHOUT OBSTRUCTION: ICD-10-CM

## 2017-12-10 LAB
ALBUMIN SERPL BCP-MCNC: 3 G/DL
ALP SERPL-CCNC: 113 U/L
ALT SERPL W/O P-5'-P-CCNC: 10 U/L
ANION GAP SERPL CALC-SCNC: 10 MMOL/L
AST SERPL-CCNC: 15 U/L
B-HCG UR QL: NEGATIVE
BASOPHILS # BLD AUTO: 0.03 K/UL
BASOPHILS NFR BLD: 0.4 %
BILIRUB SERPL-MCNC: 0.1 MG/DL
BUN SERPL-MCNC: 9 MG/DL
CALCIUM SERPL-MCNC: 8.8 MG/DL
CHLORIDE SERPL-SCNC: 106 MMOL/L
CO2 SERPL-SCNC: 22 MMOL/L
CREAT SERPL-MCNC: 0.7 MG/DL
CTP QC/QA: YES
DIFFERENTIAL METHOD: ABNORMAL
EOSINOPHIL # BLD AUTO: 0.3 K/UL
EOSINOPHIL NFR BLD: 4.3 %
ERYTHROCYTE [DISTWIDTH] IN BLOOD BY AUTOMATED COUNT: 14.4 %
EST. GFR  (AFRICAN AMERICAN): >60 ML/MIN/1.73 M^2
EST. GFR  (NON AFRICAN AMERICAN): >60 ML/MIN/1.73 M^2
GLUCOSE SERPL-MCNC: 107 MG/DL
HCT VFR BLD AUTO: 33.9 %
HGB BLD-MCNC: 10.8 G/DL
LIPASE SERPL-CCNC: 23 U/L
LYMPHOCYTES # BLD AUTO: 2.8 K/UL
LYMPHOCYTES NFR BLD: 40.4 %
MCH RBC QN AUTO: 25 PG
MCHC RBC AUTO-ENTMCNC: 31.9 G/DL
MCV RBC AUTO: 79 FL
MONOCYTES # BLD AUTO: 0.8 K/UL
MONOCYTES NFR BLD: 11 %
NEUTROPHILS # BLD AUTO: 3 K/UL
NEUTROPHILS NFR BLD: 43.8 %
PLATELET # BLD AUTO: 451 K/UL
PMV BLD AUTO: 9.1 FL
POTASSIUM SERPL-SCNC: 3.5 MMOL/L
PROT SERPL-MCNC: 7.3 G/DL
RBC # BLD AUTO: 4.32 M/UL
SODIUM SERPL-SCNC: 138 MMOL/L
WBC # BLD AUTO: 6.93 K/UL

## 2017-12-10 PROCEDURE — 80053 COMPREHEN METABOLIC PANEL: CPT

## 2017-12-10 PROCEDURE — 25000003 PHARM REV CODE 250: Performed by: EMERGENCY MEDICINE

## 2017-12-10 PROCEDURE — 96375 TX/PRO/DX INJ NEW DRUG ADDON: CPT

## 2017-12-10 PROCEDURE — 83690 ASSAY OF LIPASE: CPT

## 2017-12-10 PROCEDURE — 99285 EMERGENCY DEPT VISIT HI MDM: CPT | Mod: 25

## 2017-12-10 PROCEDURE — 81025 URINE PREGNANCY TEST: CPT | Performed by: EMERGENCY MEDICINE

## 2017-12-10 PROCEDURE — 63600175 PHARM REV CODE 636 W HCPCS: Performed by: EMERGENCY MEDICINE

## 2017-12-10 PROCEDURE — 96374 THER/PROPH/DIAG INJ IV PUSH: CPT

## 2017-12-10 PROCEDURE — 96361 HYDRATE IV INFUSION ADD-ON: CPT

## 2017-12-10 PROCEDURE — 85025 COMPLETE CBC W/AUTO DIFF WBC: CPT

## 2017-12-10 RX ORDER — ONDANSETRON 2 MG/ML
4 INJECTION INTRAMUSCULAR; INTRAVENOUS
Status: COMPLETED | OUTPATIENT
Start: 2017-12-10 | End: 2017-12-10

## 2017-12-10 RX ORDER — MORPHINE SULFATE 2 MG/ML
6 INJECTION, SOLUTION INTRAMUSCULAR; INTRAVENOUS
Status: COMPLETED | OUTPATIENT
Start: 2017-12-10 | End: 2017-12-10

## 2017-12-10 RX ORDER — HYDROMORPHONE HYDROCHLORIDE 2 MG/ML
1 INJECTION, SOLUTION INTRAMUSCULAR; INTRAVENOUS; SUBCUTANEOUS
Status: DISCONTINUED | OUTPATIENT
Start: 2017-12-10 | End: 2017-12-10

## 2017-12-10 RX ORDER — HYDROCODONE BITARTRATE AND ACETAMINOPHEN 5; 325 MG/1; MG/1
1 TABLET ORAL EVERY 4 HOURS PRN
Qty: 12 TABLET | Refills: 0 | Status: SHIPPED | OUTPATIENT
Start: 2017-12-10 | End: 2018-01-08

## 2017-12-10 RX ORDER — ONDANSETRON 4 MG/1
8 TABLET, ORALLY DISINTEGRATING ORAL EVERY 6 HOURS PRN
Qty: 12 TABLET | Refills: 0 | Status: SHIPPED | OUTPATIENT
Start: 2017-12-10 | End: 2018-02-13 | Stop reason: ALTCHOICE

## 2017-12-10 RX ADMIN — MORPHINE SULFATE 6 MG: 2 INJECTION, SOLUTION INTRAMUSCULAR; INTRAVENOUS at 09:12

## 2017-12-10 RX ADMIN — ONDANSETRON 4 MG: 2 INJECTION INTRAMUSCULAR; INTRAVENOUS at 09:12

## 2017-12-10 RX ADMIN — SODIUM CHLORIDE 1000 ML: 0.9 INJECTION, SOLUTION INTRAVENOUS at 09:12

## 2017-12-10 NOTE — ED PROVIDER NOTES
Encounter Date: 12/10/2017    SCRIBE #1 NOTE: I, Wade Arroyo, am scribing for, and in the presence of,  Dr. Marlo Wen. I have scribed the entire note.       History     Chief Complaint   Patient presents with    Abdominal Pain     pt presents to ED today c/o right sided abdominal pain x 2 hours denies n/v      Time patient was seen by the provider: 8:55 AM      The patient is a 22 y.o. female with hx of: gallbladder disease that presents to the ED with a complaint of abdominal pain in the RUQ, which has increased past 2 hrs. She reports pain with associated nausea and vomiting, but denies diarrhea. Episodes are intermittent and last about 4-5 hours. She does occasionally medicate with tylenol. She has known gallstones, but is not currently scheduled for gallstone removal, and does not have a GI surgeon.         The history is provided by the patient.     Review of patient's allergies indicates:   Allergen Reactions    Grass pollen-coreen grass standard      Past Medical History:   Diagnosis Date    Asthma     Diabetes mellitus      History reviewed. No pertinent surgical history.  History reviewed. No pertinent family history.  Social History   Substance Use Topics    Smoking status: Never Smoker    Smokeless tobacco: Never Used    Alcohol use No     Review of Systems   Constitutional: Negative for diaphoresis.   HENT: Negative for facial swelling.    Respiratory: Negative for shortness of breath.    Cardiovascular: Negative for chest pain.   Gastrointestinal: Positive for abdominal pain, nausea and vomiting. Negative for diarrhea.   Genitourinary: Negative for dysuria.   Musculoskeletal: Negative for back pain.   Skin: Negative for rash.   Neurological: Negative for weakness.   Hematological: Does not bruise/bleed easily.       Physical Exam     Initial Vitals [12/10/17 0809]   BP Pulse Resp Temp SpO2   (!) 136/90 82 18 97.7 °F (36.5 °C) 99 %      MAP       105.33         Physical Exam    Nursing note and  vitals reviewed.  Constitutional: She appears well-developed and well-nourished. She is not diaphoretic. No distress.   HENT:   Head: Normocephalic and atraumatic.   Eyes: EOM are normal. Pupils are equal, round, and reactive to light.   Neck: Normal range of motion. Neck supple.   Cardiovascular: Normal rate and regular rhythm. Exam reveals no gallop and no friction rub.    No murmur heard.  Pulmonary/Chest: Breath sounds normal. No respiratory distress. She has no wheezes. She has no rhonchi. She has no rales.   Abdominal: Soft. She exhibits no distension. There is tenderness. There is no rebound and no guarding.   Moderate lateral quadrant tenderness, positive murphys sign   Musculoskeletal: Normal range of motion. She exhibits no edema or tenderness.   Neurological: She is alert and oriented to person, place, and time. She has normal strength. No cranial nerve deficit or sensory deficit.   Skin: Skin is warm and dry. No rash noted. No erythema.   Psychiatric: She has a normal mood and affect. Her behavior is normal. Judgment and thought content normal.         ED Course   Procedures  Labs Reviewed   CBC W/ AUTO DIFFERENTIAL - Abnormal; Notable for the following:        Result Value    Hemoglobin 10.8 (*)     Hematocrit 33.9 (*)     MCV 79 (*)     MCH 25.0 (*)     MCHC 31.9 (*)     Platelets 451 (*)     MPV 9.1 (*)     All other components within normal limits   COMPREHENSIVE METABOLIC PANEL - Abnormal; Notable for the following:     CO2 22 (*)     Albumin 3.0 (*)     All other components within normal limits   LIPASE   POCT URINE PREGNANCY             Medical Decision Making:   Differential Diagnosis:   Cholecystis, biliary colic, hepatitis, gastritis  Clinical Tests:   Lab Tests: Ordered and Reviewed  Radiological Study: Ordered and Reviewed  ED Management:  Pt with known gallbladder disease presents with flare for 2 hrs PTA. will check labs and US, and likely discharge home.    10:04 AM  US shows  cholelithiasis, no acute cholecystitis.   Patient with biliary colic, no evidence of cholecysitis, will discharge home with surgical follow up.                      ED Course      Clinical Impression:   The primary encounter diagnosis was Biliary colic. Diagnoses of Abdominal pain and Calculus of gallbladder without cholecystitis without obstruction were also pertinent to this visit.      I, Dr. Marlo Wen III, personally performed the services described in this documentation. All medical record entries made by the scribe were at my direction and in my presence.  I have reviewed the chart and agree that the record reflects my personal performance and is accurate and complete. Marlo Wen III, MD.  6:19 AM 12/11/2017                        Marlo Wen III, MD  12/11/17 0619

## 2017-12-10 NOTE — ED NOTES
APPEARANCE: Alert, oriented and in no acute distress.  CARDIAC: Normal rate and rhythm, no murmur heard.   PERIPHERAL VASCULAR: peripheral pulses present. Normal cap refill. No edema. Warm to touch.    RESPIRATORY:Normal rate and effort, breath sounds clear bilaterally throughout chest. Respirations are equal and unlabored no obvious signs of distress.  GASTRO: soft, bowel sounds normal,  no abdominal distention. RUQ pain nausea and vomiting x 2 hours PTA. Pt not actively vomiting.   MUSC: Full ROM. No bony tenderness or soft tissue tenderness. No obvious deformity.  SKIN: Skin is warm and dry, normal skin turgor, mucous membranes moist.  NEURO: 5/5 strength major flexors/extensors bilaterally. Sensory intact to light touch bilaterally. [place] coma scale: eyes open spontaneously-4, oriented & converses-5, obeys commands-6. No neurological abnormalities.   MENTAL STATUS: awake, alert and aware of environment.  EYE: PERRL, both eyes: pupils brisk and reactive to light. Normal size.  ENT: EARS: no obvious drainage. NOSE: no active bleeding.

## 2017-12-10 NOTE — ED TRIAGE NOTES
Pt presents to ED today c/o right upper abdominal pain, nausea, and vomiting that started 2 hours PTA. Pt not actively vomiting.

## 2018-01-08 PROBLEM — Z79.4 TYPE 2 DIABETES MELLITUS WITHOUT COMPLICATION, WITH LONG-TERM CURRENT USE OF INSULIN: Status: ACTIVE | Noted: 2018-01-08

## 2018-01-08 PROBLEM — E11.9 TYPE 2 DIABETES MELLITUS WITHOUT COMPLICATION, WITH LONG-TERM CURRENT USE OF INSULIN: Status: ACTIVE | Noted: 2018-01-08

## 2018-02-12 ENCOUNTER — HOSPITAL ENCOUNTER (EMERGENCY)
Facility: HOSPITAL | Age: 23
Discharge: HOME | End: 2018-02-13
Attending: EMERGENCY MEDICINE
Payer: MEDICAID

## 2018-02-12 DIAGNOSIS — R11.2 NON-INTRACTABLE VOMITING WITH NAUSEA, UNSPECIFIED VOMITING TYPE: ICD-10-CM

## 2018-02-12 DIAGNOSIS — K80.50 BILIARY COLIC: Primary | ICD-10-CM

## 2018-02-12 LAB
BASOPHILS # BLD AUTO: 0.02 K/UL
BASOPHILS NFR BLD: 0.2 %
DIFFERENTIAL METHOD: ABNORMAL
EOSINOPHIL # BLD AUTO: 0.2 K/UL
EOSINOPHIL NFR BLD: 2.2 %
ERYTHROCYTE [DISTWIDTH] IN BLOOD BY AUTOMATED COUNT: 14.9 %
HCT VFR BLD AUTO: 32.4 %
HGB BLD-MCNC: 10.2 G/DL
LYMPHOCYTES # BLD AUTO: 2.4 K/UL
LYMPHOCYTES NFR BLD: 27 %
MCH RBC QN AUTO: 25.1 PG
MCHC RBC AUTO-ENTMCNC: 31.5 G/DL
MCV RBC AUTO: 80 FL
MONOCYTES # BLD AUTO: 0.9 K/UL
MONOCYTES NFR BLD: 9.5 %
NEUTROPHILS # BLD AUTO: 5.5 K/UL
NEUTROPHILS NFR BLD: 61 %
PLATELET # BLD AUTO: 461 K/UL
PMV BLD AUTO: 9.6 FL
RBC # BLD AUTO: 4.07 M/UL
WBC # BLD AUTO: 9.01 K/UL

## 2018-02-12 PROCEDURE — 83690 ASSAY OF LIPASE: CPT

## 2018-02-12 PROCEDURE — 96374 THER/PROPH/DIAG INJ IV PUSH: CPT

## 2018-02-12 PROCEDURE — 80053 COMPREHEN METABOLIC PANEL: CPT

## 2018-02-12 PROCEDURE — 99284 EMERGENCY DEPT VISIT MOD MDM: CPT | Mod: 25

## 2018-02-12 PROCEDURE — 96375 TX/PRO/DX INJ NEW DRUG ADDON: CPT

## 2018-02-12 PROCEDURE — 85025 COMPLETE CBC W/AUTO DIFF WBC: CPT

## 2018-02-12 RX ORDER — KETOROLAC TROMETHAMINE 30 MG/ML
30 INJECTION, SOLUTION INTRAMUSCULAR; INTRAVENOUS
Status: COMPLETED | OUTPATIENT
Start: 2018-02-13 | End: 2018-02-13

## 2018-02-12 RX ORDER — ONDANSETRON 2 MG/ML
4 INJECTION INTRAMUSCULAR; INTRAVENOUS
Status: COMPLETED | OUTPATIENT
Start: 2018-02-13 | End: 2018-02-13

## 2018-02-13 VITALS
OXYGEN SATURATION: 98 % | DIASTOLIC BLOOD PRESSURE: 57 MMHG | BODY MASS INDEX: 45.99 KG/M2 | SYSTOLIC BLOOD PRESSURE: 116 MMHG | HEIGHT: 67 IN | RESPIRATION RATE: 16 BRPM | HEART RATE: 73 BPM | WEIGHT: 293 LBS | TEMPERATURE: 98 F

## 2018-02-13 LAB
ALBUMIN SERPL BCP-MCNC: 3.3 G/DL
ALP SERPL-CCNC: 103 U/L
ALT SERPL W/O P-5'-P-CCNC: 8 U/L
ANION GAP SERPL CALC-SCNC: 8 MMOL/L
AST SERPL-CCNC: 13 U/L
B-HCG UR QL: NEGATIVE
BILIRUB SERPL-MCNC: 0.2 MG/DL
BUN SERPL-MCNC: 10 MG/DL
CALCIUM SERPL-MCNC: 9.1 MG/DL
CHLORIDE SERPL-SCNC: 105 MMOL/L
CO2 SERPL-SCNC: 26 MMOL/L
CREAT SERPL-MCNC: 0.8 MG/DL
CTP QC/QA: YES
EST. GFR  (AFRICAN AMERICAN): >60 ML/MIN/1.73 M^2
EST. GFR  (NON AFRICAN AMERICAN): >60 ML/MIN/1.73 M^2
GLUCOSE SERPL-MCNC: 129 MG/DL
LIPASE SERPL-CCNC: 19 U/L
POTASSIUM SERPL-SCNC: 3.7 MMOL/L
PROT SERPL-MCNC: 7 G/DL
SODIUM SERPL-SCNC: 139 MMOL/L

## 2018-02-13 PROCEDURE — 63600175 PHARM REV CODE 636 W HCPCS: Performed by: EMERGENCY MEDICINE

## 2018-02-13 PROCEDURE — 81025 URINE PREGNANCY TEST: CPT | Performed by: EMERGENCY MEDICINE

## 2018-02-13 RX ORDER — NAPROXEN 500 MG/1
500 TABLET ORAL 2 TIMES DAILY WITH MEALS
Qty: 30 TABLET | Refills: 0 | Status: SHIPPED | OUTPATIENT
Start: 2018-02-13

## 2018-02-13 RX ORDER — ONDANSETRON 4 MG/1
8 TABLET, ORALLY DISINTEGRATING ORAL EVERY 8 HOURS PRN
Qty: 15 TABLET | Refills: 0 | Status: SHIPPED | OUTPATIENT
Start: 2018-02-13

## 2018-02-13 RX ORDER — HYDROCODONE BITARTRATE AND ACETAMINOPHEN 5; 325 MG/1; MG/1
1 TABLET ORAL EVERY 8 HOURS PRN
Qty: 10 TABLET | Refills: 0 | Status: ON HOLD | OUTPATIENT
Start: 2018-02-13 | End: 2018-02-21 | Stop reason: HOSPADM

## 2018-02-13 RX ADMIN — KETOROLAC TROMETHAMINE 30 MG: 30 INJECTION, SOLUTION INTRAMUSCULAR at 12:02

## 2018-02-13 RX ADMIN — ONDANSETRON 4 MG: 2 INJECTION INTRAMUSCULAR; INTRAVENOUS at 12:02

## 2018-02-13 NOTE — ED PROVIDER NOTES
NAME:  Melissa Herrera  CSN:     [id number]  MRN:    [id number]  ADMIT DATE: 2/12/2018        eMERGENCY dEPARTMENT eNCOUnter    CHIEF COMPLAINT    Chief Complaint   Patient presents with    Abdominal Pain     onset of ruq abdominal pain this evening eith 1 episode of vomiting. hx. of gallstones with same symptoms       HPI    Melissa Herrera is a 23 y.o. female who presents to the ED RUQ abd pain since 6-7pm tonight.  Pain was sudden onset, colicky, worsening, now 9/10, radiating to right flank.  Symptoms are associated with nausea and nonbloody/nonbilious emesis ×1.  Denies any diarrhea, fever or chills.  Symptoms are worsened by PO intake.  Patient denies any alleviating factors.   Patient has prior history of similar symptoms, diagnosed with biliary colic.  Patient has follow-up with GI next month.        ALLERGIES    Review of patient's allergies indicates:   Allergen Reactions    Grass pollen-june grass standard        PAST MEDICAL HISTORY  Past Medical History:   Diagnosis Date    Asthma     Diabetes mellitus        SURGICAL HISTORY    No past surgical history on file.    SOCIAL HISTORY    Social History     Social History    Marital status: Single     Spouse name: N/A    Number of children: N/A    Years of education: N/A     Social History Main Topics    Smoking status: Never Smoker    Smokeless tobacco: Never Used    Alcohol use No    Drug use: Unknown    Sexual activity: Not on file     Other Topics Concern    Not on file     Social History Narrative    No narrative on file       FAMILY HISTORY    No family history on file.    REVIEW OF SYSTEMS   Review of Systems   Constitutional: Negative for chills and fever.   Gastrointestinal: Positive for abdominal pain and vomiting. Negative for diarrhea.   Genitourinary: Negative for dysuria.   Musculoskeletal: Negative for myalgias.   Neurological: Negative for dizziness.     All Systems otherwise negative except as noted in the History of Present  "Illness.        PHYSICAL EXAM    Reviewed Triage Note  VITAL SIGNS:   ED Triage Vitals   Enc Vitals Group      BP 02/12/18 2223 129/69      Pulse 02/12/18 2223 87      Resp 02/12/18 2223 20      Temp 02/12/18 2223 97.7 °F (36.5 °C)      Temp src 02/12/18 2223 Oral      SpO2 02/13/18 0046 98 %      Weight 02/12/18 2223 (!) 315 lb      Height 02/12/18 2223 5' 7"      Head Circumference --       Peak Flow --       Pain Score --       Pain Loc --       Pain Edu? --       Excl. in GC? --        Patient Vitals for the past 24 hrs:   BP Temp Temp src Pulse Resp SpO2 Height Weight   02/13/18 0046 (!) 116/57 97.6 °F (36.4 °C) Oral 73 16 98 % - -   02/12/18 2223 129/69 97.7 °F (36.5 °C) Oral 87 20 - 5' 7" (1.702 m) (!) 142.9 kg (315 lb)           Physical Exam    Constitutional:  Well-developed, well-nourished.  HENT:  Normocephalic, atraumatic.  Eyes:  EOMI. Conjunctiva normal without discharge.   Neck: Normal range of motion. No midline tenderness or vertebral step-off. No stridor. No meningismus. No lymphadenopathy.   Respiratory:  Normal breath sounds bilaterally.  No respiratory distress, retractions, or conversational dyspnea. No wheezing. No rhonchi. No rales.   Cardiovascular:  Normal heart rate. Normal rhythm. No pitting lower extremity edema.   GI:  OBESE, Abdomen soft, non-distended,+ RUQ tenderness. Normal bowel sounds.+ voluntary guarding, no rigidity or rebound.    : No CVA tenderness.   Musculoskeletal:  No gross deformity or limited range of motion of all major joints. No palpable bony deformity. No tenderness to palpation.  Integument:  Warm and dry. No rash.  Neurologic:  Normal motor function. Normal sensory function. No focal deficits noted. Alert and Interactive.  Psychiatric:  Affect normal. Mood normal.         LABS  Pertinent labs reviewed. (See chart for details)   Labs Reviewed   CBC W/ AUTO DIFFERENTIAL - Abnormal; Notable for the following:        Result Value    Hemoglobin 10.2 (*)     " Hematocrit 32.4 (*)     MCV 80 (*)     MCH 25.1 (*)     MCHC 31.5 (*)     RDW 14.9 (*)     Platelets 461 (*)     All other components within normal limits   COMPREHENSIVE METABOLIC PANEL - Abnormal; Notable for the following:     Glucose 129 (*)     Albumin 3.3 (*)     ALT 8 (*)     All other components within normal limits   POCT URINE PREGNANCY - Normal   LIPASE         RADIOLOGY    Imaging Results          US Abdomen Limited (Final result)  Result time 02/13/18 00:40:41    Final result by Erin Dejesus MD (02/13/18 00:40:41)                 Impression:        1. Cholelithiasis without sonographic evidence of acute cholecystitis.        Electronically signed by: ERIN DEJESUS  Date:     02/13/18  Time:    00:40              Narrative:    Time of Procedure: 02/13/18 00:01:22  Accession # [id number]    Reason for study: Right upper quadrant pain    Comparison: 12/10/2017    Technique: Limited right upper quadrant ultrasound was performed.    Findings:     The liver is normal in size measuring 15.3cm.  Hepatic parenchyma is homogeneous without evidence for masses.      No intra- or extrahepatic biliary ductal dilatation. The common bile duct measures 0.3 cm.  There are several stones within the gallbladder lumen measuring up to 1.4 cm. There is no significant gallbladder wall thickening.  Sonographic Acosta's sign is negative.     The pancreas is obscured by bowel gas. The right kidney demonstrates no evidence of hydronephrosis.                              PROCEDURES    Procedures        ED COURSE & MEDICAL DECISION MAKING    This is an emergent evaluation of a 23 y.o. female who presents with upper abdominal pain.   After my evaluation, I believe that the patient has biliary colic based on RUQ tenderness and US findings.   I doubt cholecystitis based on US, hx and labs.   I also doubt appendicitis based on presentation and location of pain.   Overall, I do not believe that the patient has an emergent surgical  condition at this time and can be safely discharged home.   I discussed rx, return precautions, diet instructions and aftercare instructions with the pt to which they expressed understanding.          Medications   ketorolac injection 30 mg (30 mg Intravenous Given 2/13/18 0030)   ondansetron injection 4 mg (4 mg Intravenous Given 2/13/18 0030)       ED Course as of Feb 13 0101   Tue Feb 13, 2018   0050 WBC: 9.01 [NP]   0050 Hemoglobin: (!) 10.2 [NP]   0050 Platelets: (!) 461 [NP]   0050 Potassium: 3.7 [NP]   0050 Sodium: 139 [NP]   0050 Chloride: 105 [NP]   0050 CO2: 26 [NP]   0050 Glucose: (!) 129 [NP]   0050 BUN, Bld: 10 [NP]   0050 Creatinine: 0.8 [NP]   0050 Ultrasound right upper quadrant reports reviewed by me.  Noted cholelithiasis without evidence of cholecystitis.  Labs otherwise show no significant abnormalities  [NP]      ED Course User Index  [NP] Malachi Turk MD            DISPOSITION  Patient discharged in stable condition.    DISCHARGE INSTRUCTIONS & MEDS     Melissa Herrera   Home Medication Instructions YESSY:[id number]    Printed on:02/13/18 0101   Medication Information                      ferrous sulfate 324 mg (65 mg iron) TbEC               hydrocodone-acetaminophen 5-325mg (NORCO) 5-325 mg per tablet  Take 1 tablet by mouth every 8 (eight) hours as needed for Pain.             IBUPROFEN ORAL  Take by mouth.             metformin (GLUCOPHAGE) 500 MG tablet  Take 500 mg by mouth once daily. Pt did not bring med bottles with her.             montelukast (SINGULAIR) 10 mg tablet               naproxen (NAPROSYN) 500 MG tablet  Take 1 tablet (500 mg total) by mouth 2 (two) times daily with meals. PRN pain             ondansetron (ZOFRAN-ODT) 4 MG TbDL  Take 2 tablets (8 mg total) by mouth every 8 (eight) hours as needed (nausea/vomiting).                   New Prescriptions    HYDROCODONE-ACETAMINOPHEN 5-325MG (NORCO) 5-325 MG PER TABLET    Take 1 tablet by mouth every 8 (eight) hours as  needed for Pain.    NAPROXEN (NAPROSYN) 500 MG TABLET    Take 1 tablet (500 mg total) by mouth 2 (two) times daily with meals. PRN pain    ONDANSETRON (ZOFRAN-ODT) 4 MG TBDL    Take 2 tablets (8 mg total) by mouth every 8 (eight) hours as needed (nausea/vomiting).           FINAL IMPRESSION    1. Biliary colic    2. Non-intractable vomiting with nausea, unspecified vomiting type         DISCLAIMER: This note was prepared with Dragon NaturallySpeaking voice recognition transcription software. Garbled syntax, mangled pronouns, and other bizarre constructions may be attributed to that software system.      Malachi Turk MD  02/13/2018  1:01 AM       Malachi Turk MD  02/13/18 0228

## 2018-02-13 NOTE — ED TRIAGE NOTES
"Patient presents to the ED with reports of having RUQ abdominal pain that started tonight. Symptoms that include nausea and vomiting after eating cereal. Patient states having a hx of gallstones and has been following up with Dr. Wagner for possible surgery. States next appointment is "in two months". Denies any fever, chills, diarrhea, or dysuria.     Review of patient's allergies indicates:   Allergen Reactions    Grass pollen- grass standard         Patient has verified the spelling of their name and  on armband.   APPEARANCE: Patient is alert, calm, oriented x 4, and does not appear distressed.  SKIN: Skin is normal for race, warm, and dry. Normal skin turgor and mucous membranes moist.  CARDIAC: Normal rate and no murmur heard.   RESPIRATORY:Normal rate and effort. Breath sounds clear bilaterally throughout chest. Respirations are equal and unlabored.    GASTRO: Bowel sounds normal, abdomen is soft, and no abdominal distention. +RUQ tenderness with +nausea and +vomiting.   MUSCLE: Full ROM. No bony tenderness or soft tissue tenderness. No obvious deformity.  PERIPHERAL VASCULAR: peripheral pulses present. Normal cap refill. No edema. Warm to touch.  MENTAL STATUS: awake, alert and aware of environment.  : Denies any dysuria or hematuria.   "

## 2018-02-19 ENCOUNTER — HOSPITAL ENCOUNTER (OUTPATIENT)
Facility: HOSPITAL | Age: 23
Discharge: HOME | End: 2018-02-21
Attending: EMERGENCY MEDICINE | Admitting: SURGERY
Payer: MEDICAID

## 2018-02-19 ENCOUNTER — ANESTHESIA EVENT (OUTPATIENT)
Dept: SURGERY | Facility: HOSPITAL | Age: 23
End: 2018-02-19
Payer: MEDICAID

## 2018-02-19 DIAGNOSIS — K80.50 RECURRENT BILIARY COLIC: Primary | ICD-10-CM

## 2018-02-19 DIAGNOSIS — K81.0 ACUTE CHOLECYSTITIS: ICD-10-CM

## 2018-02-19 DIAGNOSIS — Z01.818 PREOP EXAMINATION: ICD-10-CM

## 2018-02-19 LAB
ALBUMIN SERPL BCP-MCNC: 3.4 G/DL
ALP SERPL-CCNC: 110 U/L
ALT SERPL W/O P-5'-P-CCNC: 7 U/L
ANION GAP SERPL CALC-SCNC: 8 MMOL/L
AST SERPL-CCNC: 12 U/L
BASOPHILS # BLD AUTO: 0.02 K/UL
BASOPHILS NFR BLD: 0.3 %
BILIRUB SERPL-MCNC: 0.3 MG/DL
BUN SERPL-MCNC: 13 MG/DL
CALCIUM SERPL-MCNC: 9.1 MG/DL
CHLORIDE SERPL-SCNC: 107 MMOL/L
CO2 SERPL-SCNC: 23 MMOL/L
CREAT SERPL-MCNC: 0.8 MG/DL
DIFFERENTIAL METHOD: ABNORMAL
EOSINOPHIL # BLD AUTO: 0.1 K/UL
EOSINOPHIL NFR BLD: 1.3 %
ERYTHROCYTE [DISTWIDTH] IN BLOOD BY AUTOMATED COUNT: 15.1 %
EST. GFR  (AFRICAN AMERICAN): >60 ML/MIN/1.73 M^2
EST. GFR  (NON AFRICAN AMERICAN): >60 ML/MIN/1.73 M^2
ESTIMATED AVG GLUCOSE: 103 MG/DL
GLUCOSE SERPL-MCNC: 93 MG/DL
HBA1C MFR BLD HPLC: 5.2 %
HCT VFR BLD AUTO: 33.3 %
HGB BLD-MCNC: 10.6 G/DL
LIPASE SERPL-CCNC: 14 U/L
LYMPHOCYTES # BLD AUTO: 1.2 K/UL
LYMPHOCYTES NFR BLD: 14.8 %
MCH RBC QN AUTO: 25 PG
MCHC RBC AUTO-ENTMCNC: 31.8 G/DL
MCV RBC AUTO: 79 FL
MONOCYTES # BLD AUTO: 0.5 K/UL
MONOCYTES NFR BLD: 6.8 %
NEUTROPHILS # BLD AUTO: 6.1 K/UL
NEUTROPHILS NFR BLD: 76.5 %
PLATELET # BLD AUTO: 437 K/UL
PMV BLD AUTO: 9.4 FL
POTASSIUM SERPL-SCNC: 4.1 MMOL/L
PROT SERPL-MCNC: 7.3 G/DL
RBC # BLD AUTO: 4.24 M/UL
SODIUM SERPL-SCNC: 138 MMOL/L
WBC # BLD AUTO: 7.91 K/UL

## 2018-02-19 PROCEDURE — 83036 HEMOGLOBIN GLYCOSYLATED A1C: CPT

## 2018-02-19 PROCEDURE — 36415 COLL VENOUS BLD VENIPUNCTURE: CPT

## 2018-02-19 PROCEDURE — 80053 COMPREHEN METABOLIC PANEL: CPT

## 2018-02-19 PROCEDURE — 85025 COMPLETE CBC W/AUTO DIFF WBC: CPT

## 2018-02-19 PROCEDURE — 63600175 PHARM REV CODE 636 W HCPCS: Performed by: EMERGENCY MEDICINE

## 2018-02-19 PROCEDURE — G0378 HOSPITAL OBSERVATION PER HR: HCPCS

## 2018-02-19 PROCEDURE — 25000003 PHARM REV CODE 250: Performed by: STUDENT IN AN ORGANIZED HEALTH CARE EDUCATION/TRAINING PROGRAM

## 2018-02-19 PROCEDURE — 96375 TX/PRO/DX INJ NEW DRUG ADDON: CPT

## 2018-02-19 PROCEDURE — 83690 ASSAY OF LIPASE: CPT

## 2018-02-19 PROCEDURE — 96374 THER/PROPH/DIAG INJ IV PUSH: CPT

## 2018-02-19 PROCEDURE — 99285 EMERGENCY DEPT VISIT HI MDM: CPT | Mod: 25

## 2018-02-19 PROCEDURE — 25000003 PHARM REV CODE 250: Performed by: EMERGENCY MEDICINE

## 2018-02-19 PROCEDURE — 93005 ELECTROCARDIOGRAM TRACING: CPT

## 2018-02-19 PROCEDURE — 96361 HYDRATE IV INFUSION ADD-ON: CPT

## 2018-02-19 RX ORDER — HYDROCODONE BITARTRATE AND ACETAMINOPHEN 7.5; 325 MG/1; MG/1
1 TABLET ORAL EVERY 6 HOURS PRN
Status: DISCONTINUED | OUTPATIENT
Start: 2018-02-19 | End: 2018-02-21 | Stop reason: HOSPADM

## 2018-02-19 RX ORDER — ONDANSETRON 2 MG/ML
4 INJECTION INTRAMUSCULAR; INTRAVENOUS
Status: COMPLETED | OUTPATIENT
Start: 2018-02-19 | End: 2018-02-19

## 2018-02-19 RX ORDER — GLUCAGON 1 MG
1 KIT INJECTION
Status: DISCONTINUED | OUTPATIENT
Start: 2018-02-19 | End: 2018-02-21

## 2018-02-19 RX ORDER — INSULIN ASPART 100 [IU]/ML
1-10 INJECTION, SOLUTION INTRAVENOUS; SUBCUTANEOUS EVERY 6 HOURS PRN
Status: DISCONTINUED | OUTPATIENT
Start: 2018-02-19 | End: 2018-02-21

## 2018-02-19 RX ORDER — KETOROLAC TROMETHAMINE 30 MG/ML
30 INJECTION, SOLUTION INTRAMUSCULAR; INTRAVENOUS
Status: COMPLETED | OUTPATIENT
Start: 2018-02-19 | End: 2018-02-19

## 2018-02-19 RX ORDER — ONDANSETRON 2 MG/ML
4 INJECTION INTRAMUSCULAR; INTRAVENOUS EVERY 6 HOURS PRN
Status: DISCONTINUED | OUTPATIENT
Start: 2018-02-19 | End: 2018-02-21 | Stop reason: HOSPADM

## 2018-02-19 RX ADMIN — ONDANSETRON 4 MG: 2 INJECTION INTRAMUSCULAR; INTRAVENOUS at 03:02

## 2018-02-19 RX ADMIN — SODIUM CHLORIDE 1000 ML: 0.9 INJECTION, SOLUTION INTRAVENOUS at 03:02

## 2018-02-19 RX ADMIN — SODIUM CHLORIDE AND POTASSIUM CHLORIDE 125 ML/HR: 4.5; 1.49 INJECTION, SOLUTION INTRAVENOUS at 05:02

## 2018-02-19 RX ADMIN — HYDROCODONE BITARTRATE AND ACETAMINOPHEN 1 TABLET: 7.5; 325 TABLET ORAL at 09:02

## 2018-02-19 RX ADMIN — KETOROLAC TROMETHAMINE 30 MG: 30 INJECTION, SOLUTION INTRAMUSCULAR at 03:02

## 2018-02-19 NOTE — ED NOTES
Pt sitting comfortably awake in bed watching tv. Pt has no complaints at this time. Pt updated on plan of care. Will continue to monitor.

## 2018-02-19 NOTE — ED PROVIDER NOTES
Encounter Date: 2/19/2018    SCRIBE #1 NOTE: I, Wade Arroyo, am scribing for, and in the presence of,  Dr. Malachi Turk. I have scribed the entire note.       History     Chief Complaint   Patient presents with    Abdominal Pain     pt was recently diagnosed with gallstones, having some RUQ pain with vomiting.      Time patient was seen by the provider: 1:54 PM      The patient is a 23 y.o. female with co-morbidities including: gallstones, biliary colic who presents to the ED with a complaint of RUQ abdominal pain.   Per the patient, she was seen one week prior with the same abdominal pain and dx with gallstones.   She returns because the pain has returned to her RUQ, beginning 2 hours PTA  Pain is in RUQ, radiating to back and is accompanied by vomiting.   Pain comes in waves rated 10/10  Denies fever, chills, diarrhea.   She last ate fat-free soup and still felt pain, 1 day prior.  She last ate a small amount 3 hours prior, around 11 am.  Pt has an appointment with general surgeon at the end of this month for gallbladder removal.        The history is provided by the patient.     Review of patient's allergies indicates:   Allergen Reactions    Grass pollen-coreen grass standard      Past Medical History:   Diagnosis Date    Asthma     Diabetes mellitus      No past surgical history on file.  No family history on file.  Social History   Substance Use Topics    Smoking status: Never Smoker    Smokeless tobacco: Never Used    Alcohol use No     Review of Systems   Constitutional: Negative for chills and fever.   HENT: Negative for sore throat.    Respiratory: Negative for shortness of breath.    Cardiovascular: Negative for chest pain.   Gastrointestinal: Positive for abdominal pain (RUQ), nausea and vomiting. Negative for diarrhea.   Genitourinary: Negative for dysuria.   Musculoskeletal: Negative for back pain.   Skin: Negative for rash.   Neurological: Negative for weakness.   Hematological: Does not  bruise/bleed easily.       Physical Exam     Initial Vitals [02/19/18 1337]   BP Pulse Resp Temp SpO2   (!) 128/90 90 18 -- 100 %      MAP       102.67         Physical Exam    Nursing note and vitals reviewed.  Constitutional: She appears well-developed and well-nourished. She is not diaphoretic. No distress.   HENT:   Head: Normocephalic and atraumatic.   Eyes: Pupils are equal, round, and reactive to light.   Neck: Normal range of motion. Neck supple.   Cardiovascular: Normal rate, regular rhythm and normal heart sounds.   Pulmonary/Chest: Breath sounds normal. No respiratory distress.   Abdominal: She exhibits no distension.   Bowel sounds slightly decreased  RUQ tenderness   Musculoskeletal: Normal range of motion.   Neurological: She is alert and oriented to person, place, and time.   Skin: Skin is dry.         ED Course   Procedures  Labs Reviewed   CBC W/ AUTO DIFFERENTIAL - Abnormal; Notable for the following:        Result Value    Hemoglobin 10.6 (*)     Hematocrit 33.3 (*)     MCV 79 (*)     MCH 25.0 (*)     MCHC 31.8 (*)     RDW 15.1 (*)     Platelets 437 (*)     Gran% 76.5 (*)     Lymph% 14.8 (*)     All other components within normal limits   COMPREHENSIVE METABOLIC PANEL - Abnormal; Notable for the following:     Albumin 3.4 (*)     ALT 7 (*)     All other components within normal limits   LIPASE         US Abdomen Limited   Final Result    Cholelithiasis with slight increased prominence of the common duct measuring 5 mm with nonmobile stone in the region of the gallbladder neck. Cannot exclude early cholecystitis or intermittent obstruction. However no gallbladder wall thickening or pericholecystic fluid. Clinical correlation and further evaluation with nuclear medicine imaging as warranted.         Electronically signed by: BEKAH ARRIETA DO   Date:     02/19/18   Time:    14:41                Medical Decision Making:   History:   Old Medical Records: I decided to obtain old medical records.  Initial  Assessment:   24 y/o female with hx of gallstones and biliary colic presents with RUQ tenderness and vomiting. Will order CBC, CMP, lipase, US gallbladder, IV bolus, toradol, zofran and reassess.   Differential Diagnosis:   Biliary colic, gallstones, cholecystitis  Clinical Tests:   Lab Tests: Ordered and Reviewed  Radiological Study: Ordered and Reviewed      Workup unremarkable in the emergency department.  I consulted with general surgery, who states that they will take the patient under their service for cholecystectomy.  Patient informed and agrees with plan.                    Clinical Impression:     1. Recurrent biliary colic          Disposition:   Disposition: Placed in Observation  Condition: Stable       I, Dr. Malachi Turk, personally performed the services described in this documentation.   All medical record entries made by the scribe were at my direction and in my presence.   I have reviewed the chart and agree that the record is accurate and complete.   Malachi Turk MD.                       Malachi Turk MD  02/19/18 2908

## 2018-02-19 NOTE — H&P
Surgery History and Physical    HPI: Pt is a 24 y/o F w/ hx of DM and morbid obesity who presents to the ED due to RUQ pain off and on over the past month.  Pt reports at least 4 ER visits over the past month due to the pain w/ no improvement.  Previous US had shown gallstones and pt was to f/u w/ general surgeon.  She reports she never received appointment.  Pt reports emesis this AM and has had off and on nausea.  Has had regular BMs and voiding w/ no issues. She reports no fevers or chills.    ROS: see HPI; 14 point ROS otherwise negative    PMHx: asthma, DM  PSHx: none  Meds: see med rec  Allergies: NKDA  Fam Hx: non-contributory  Social Hx: reports no tobacco, alcohol, or IVDU    Temp: 98.2   HR: 85   RR: 17   BP: 129/67   SpO2: 100 on RA    PE: General: AAO X 3; NAD         HEENT: normocephalic; atraumatic         CV: RRR; no murmurs, rubs, or gallops         Resp: CTA b/l; no wheezes, crackles, or rhonchi         Abdomen: soft; mild TTP; ND    Labs: WBC: 7.91; H/H: 10.6/33.3            T Bili: 0.3; AST: 12; ALT: 7; Alk: 110; lipase: 14    RUQ US: cholelithiasis w/ no gallbladder wall thickening or pericholecystic fluid    A/P: 24 y/o F w/ acute cholecystitis  - admit to floor; NPO; IVF; IV zosyn  - will plan on lap jesus in AM  - risks, benefits, indications, and alternatives discussed and patient agrees to the procedure     Seen in Er with dr. Gracia owens done  Phycscial; exam done  Multiple episodes of biliary colic with acute cholecxystitis now  Labs abd scan reviewed    Will admit with antibiotics

## 2018-02-19 NOTE — ED NOTES
Pt presents to ED with LUQ abdominal pain with N/V x 2 hours. Pt has hx of gallstones and states this is what pain feels like. Pt does not present in acute distress. Will continue to monitor.

## 2018-02-20 ENCOUNTER — ANESTHESIA (OUTPATIENT)
Dept: SURGERY | Facility: HOSPITAL | Age: 23
End: 2018-02-20
Payer: MEDICAID

## 2018-02-20 PROBLEM — K81.0 ACUTE CHOLECYSTITIS: Status: ACTIVE | Noted: 2018-02-20

## 2018-02-20 LAB
B-HCG UR QL: NEGATIVE
POCT GLUCOSE: 108 MG/DL (ref 70–110)
POCT GLUCOSE: 137 MG/DL (ref 70–110)
POCT GLUCOSE: 69 MG/DL (ref 70–110)
POCT GLUCOSE: 81 MG/DL (ref 70–110)
POCT GLUCOSE: 84 MG/DL (ref 70–110)
POCT GLUCOSE: 86 MG/DL (ref 70–110)
POCT GLUCOSE: 94 MG/DL (ref 70–110)

## 2018-02-20 PROCEDURE — 81025 URINE PREGNANCY TEST: CPT

## 2018-02-20 PROCEDURE — G0378 HOSPITAL OBSERVATION PER HR: HCPCS

## 2018-02-20 PROCEDURE — 25000003 PHARM REV CODE 250: Performed by: STUDENT IN AN ORGANIZED HEALTH CARE EDUCATION/TRAINING PROGRAM

## 2018-02-20 PROCEDURE — 63600175 PHARM REV CODE 636 W HCPCS: Performed by: STUDENT IN AN ORGANIZED HEALTH CARE EDUCATION/TRAINING PROGRAM

## 2018-02-20 RX ORDER — DEXTROSE MONOHYDRATE, SODIUM CHLORIDE, AND POTASSIUM CHLORIDE 50; 1.49; 4.5 G/1000ML; G/1000ML; G/1000ML
INJECTION, SOLUTION INTRAVENOUS CONTINUOUS
Status: DISCONTINUED | OUTPATIENT
Start: 2018-02-20 | End: 2018-02-21

## 2018-02-20 RX ADMIN — DEXTROSE MONOHYDRATE, SODIUM CHLORIDE, AND POTASSIUM CHLORIDE: 50; 4.5; 1.49 INJECTION, SOLUTION INTRAVENOUS at 10:02

## 2018-02-20 RX ADMIN — HYDROCODONE BITARTRATE AND ACETAMINOPHEN 1 TABLET: 7.5; 325 TABLET ORAL at 06:02

## 2018-02-20 RX ADMIN — HYDROCODONE BITARTRATE AND ACETAMINOPHEN 1 TABLET: 7.5; 325 TABLET ORAL at 10:02

## 2018-02-20 RX ADMIN — DEXTROSE MONOHYDRATE 12.5 G: 25 INJECTION, SOLUTION INTRAVENOUS at 10:02

## 2018-02-20 RX ADMIN — SODIUM CHLORIDE AND POTASSIUM CHLORIDE 125 ML/HR: 4.5; 1.49 INJECTION, SOLUTION INTRAVENOUS at 03:02

## 2018-02-20 NOTE — ANESTHESIA PREPROCEDURE EVALUATION
02/19/2018  Melissa Herrera is a 23 y.o., female for malka lee.  PMH of asthma, DM, morbid obesity    Anesthesia Evaluation    I have reviewed the Patient Summary Reports.    I have reviewed the Nursing Notes.      Review of Systems  Anesthesia Hx:  No previous Anesthesia    Social:  Non-Smoker    Hematology/Oncology:         -- Anemia:   Cardiovascular:  Cardiovascular Normal Exercise tolerance: good     Pulmonary:   Asthma mild    Renal/:  Renal/ Normal     Hepatic/GI:   cholecystitis   Musculoskeletal:  Musculoskeletal Normal    Neurological:  Neurology Normal    Endocrine:   Diabetes        Physical Exam  General:  Morbid Obesity    Airway/Jaw/Neck:  Airway Findings: Mouth Opening: Normal Tongue: Normal  General Airway Assessment: Adult  Mallampati: I  TM Distance: Normal, at least 6 cm       Chest/Lungs:  Chest/Lungs Clear    Heart/Vascular:  Heart Findings: Normal       Mental Status:  Mental Status Findings: Normal      Lab Results   Component Value Date    WBC 7.91 02/19/2018    HGB 10.6 (L) 02/19/2018    HCT 33.3 (L) 02/19/2018     (H) 02/19/2018    ALT 7 (L) 02/19/2018    AST 12 02/19/2018     02/19/2018    K 4.1 02/19/2018     02/19/2018    CREATININE 0.8 02/19/2018    BUN 13 02/19/2018    CO2 23 02/19/2018     Wt Readings from Last 3 Encounters:   02/19/18 (!) 142.9 kg (315 lb)   02/12/18 (!) 142.9 kg (315 lb)   01/08/18 (!) 137.9 kg (304 lb)     Temp Readings from Last 3 Encounters:   02/19/18 36.8 °C (98.2 °F) (Oral)   02/13/18 36.4 °C (97.6 °F) (Oral)   12/10/17 36.5 °C (97.7 °F) (Oral)     BP Readings from Last 3 Encounters:   02/19/18 102/61   02/13/18 (!) 116/57   01/08/18 122/80     Pulse Readings from Last 3 Encounters:   02/19/18 70   02/13/18 73   01/08/18 105         Anesthesia Plan  Type of Anesthesia, risks & benefits discussed:  Anesthesia Type:   general  Patient's Preference:   Intra-op Monitoring Plan: standard ASA monitors  Intra-op Monitoring Plan Comments:   Post Op Pain Control Plan: multimodal analgesia  Post Op Pain Control Plan Comments:   Induction:   rapid sequence and IV  Beta Blocker:  Patient is not currently on a Beta-Blocker (No further documentation required).       Informed Consent: Patient understands risks and agrees with Anesthesia plan.  Questions answered. Anesthesia consent signed with patient.  ASA Score: 3     Day of Surgery Review of History & Physical:    H&P update referred to the surgeon.         Ready For Surgery From Anesthesia Perspective.

## 2018-02-20 NOTE — PROGRESS NOTES
Patient has BP of 92/53.  Patient denies any pain or light-headedness.  Patient walked in hallway with Mobility Tech.  Dr. Waggoner notified.  Started on diet.  Updated patient that she will have nothing by mouth after midnight.  Verbalized understanding.  Will continue to monitor.

## 2018-02-20 NOTE — ED NOTES
Pt to room with er tech per whch and family member and pt reports no valuables to be secured and has surgical consents and iv fluids to floor with pt

## 2018-02-20 NOTE — PLAN OF CARE
TN met with pt and mother Triny coleman    independent prior to admit - no hh, no dme      grandmother will transport to home     to OR today for lap jesus -- acute cholecystitis        02/20/18 1664   Discharge Assessment   Assessment Type Discharge Planning Assessment   Confirmed/corrected address and phone number on facesheet? Yes   Assessment information obtained from? Patient;Caregiver;Medical Record   Expected Length of Stay (days) 2   Communicated expected length of stay with patient/caregiver yes   Prior to hospitilization cognitive status: Alert/Oriented   Prior to hospitalization functional status: Independent   Current cognitive status: Alert/Oriented   Current Functional Status: Independent   Lives With parent(s)   Able to Return to Prior Arrangements yes   Is patient able to care for self after discharge? Yes   Who are your caregiver(s) and their phone number(s)? (mother Triny  [phone]  )   Patient's perception of discharge disposition home or selfcare   Readmission Within The Last 30 Days no previous admission in last 30 days   Patient currently being followed by outpatient case management? Yes   Patient currently receives any other outside agency services? No   Equipment Currently Used at Home none   Do you have any problems affording any of your prescribed medications? No  ([facility] )   Is the patient taking medications as prescribed? yes   Does the patient have transportation home? Yes   Transportation Available family or friend will provide   Does the patient receive services at the [facility]? No   Discharge Plan A Home;Home with family   Patient/Family In Agreement With Plan yes

## 2018-02-20 NOTE — PROGRESS NOTES
"  [facility]  Adult Nutrition  Consult Note    SUMMARY     Recommendations    Recommendation/Intervention:   1. Advance diet to 2000 ADA   2. Provided diet education on DM/weight loss with handouts and f/u resources.   3. RD to monitor    Goals: Meet 85% EEN  Nutrition Goal Status: new  Communication of RD Recs: reviewed with RN    Continuum of Care Plan    Referral to Outpatient Services:  (D/C planning: ADA diet)    Reason for Assessment    Reason for Assessment: identified at risk by screening criteria  Diagnosis:  (cholecystitis)  Relevent Medical History: DM       General Information Comments: Patient NPO for procedure. Denies issues with n/v/chewing/swallowing. Reports being Dx with DM 3 years ago but  no education on diet. Provided education and handout and reviewed with patient and mom. Discussed weight loss and provided tips.     Nutrition Prescription Ordered    Current Diet Order: NPO      Evaluation of Received Nutrients/Fluid Intake     Energy Calories Required: not meeting needs   Protein Required: not meeting needs      I/O: 500/100       Fluid Required:  (per MD)     Nutrition Risk Screen     Nutrition Risk Screen: no indicators present    Nutrition/Diet History     Food Preferences: Denies cultural, Sikhism, ethnic   Factors Affecting Nutritional Intake: NPO     Labs/Tests/Procedures/Meds     Pertinent Labs Reviewed: reviewed, pertinent  Pertinent Labs Comments: alb 3.4  Pertinent Medications Reviewed: reviewed, pertinent       Physical Findings    Overall Physical Appearance: obese   Oral/Mouth Cavity: WDL  Skin: intact    Anthropometrics    Temp: 98.5 °F (36.9 °C)     Height: 5' 7" (170.2 cm)  Weight Method: Bed Scale  Weight: (!) 139.9 kg (308 lb 6.8 oz)     Ideal Body Weight (IBW), Female: 135 lb     % Ideal Body Weight, Female (lb): 228.47 lb  BMI (Calculated): 48.4  BMI Grade: greater than 40 - morbid obesity     Estimated/Assessed Needs    Weight Used For Calorie " Calculations: (!) 139 kg (306 lb 7 oz)      Energy Calorie Requirements (kcal): 2000 kcal  Energy Need Method: Kcal/kg     RMR (Badger-St. Jeor Equation): 2187.62      Weight Used For Protein Calculations: 61 kg (134 lb 7.7 oz) (IBW)  Protein Requirements: 75g     Fluid Need Method: RDA Method      RDA Method (mL): 2000       CHO Requirement: 250g     Assessment and Plan    Nutrition Dx: Excessive kcal intake r/t non-compliance with ADA diet as evidenced by BMI 52  Nutrition Dx: New      Monitor and Evaluation    Food and Nutrient Intake: energy intake, food and beverage intake  Food and Nutrient Adminstration: diet order  Knowledge/Beliefs/Attitudes: food and nutrition knowledge/skill  Physical Activity and Function: nutrition-related ADLs and IADLs  Anthropometric Measurements: weight, weight change  Biochemical Data, Medical Tests and Procedures: electrolyte and renal panel, glucose/endocrine profile  Nutrition-Focused Physical Findings: overall appearance    Nutrition Risk    Level of Risk:  (2 x week)    Nutrition Follow-Up    RD Follow-up?: Yes

## 2018-02-20 NOTE — PROGRESS NOTES
Surgery Progress Note:     Overnight:  CINDYEON, NPO for surgery this AM. pain controlled, denies n/v, fever or chills.     Temp:  [96.9 °F (36.1 °C)-98.4 °F (36.9 °C)] 98.4 °F (36.9 °C)  Pulse:  [63-90] 72  Resp:  [17-20] 19  SpO2:  [100 %] 100 %  BP: (101-129)/(51-90) 118/59      Intake/Output Summary (Last 24 hours) at 02/20/18 0825  Last data filed at 02/20/18 0700   Gross per 24 hour   Intake          2160.42 ml   Output              400 ml   Net          1760.42 ml         Physical Exam   Constitutional:  Awake, alert and oriented x 3, NAD  HENT: ncat, mmm, perrla, eomi, neck normal rom and supple   Cardiovascular: RRR no mrg  Pulmonary/Chest: Effort normal, CTA b/l no wheezes, rales, rhonchi   Abdominal: Soft. +bs, mildly ttp  Musculoskeletal: Normal ROM.  no edema.   Neurological:  AAOx3, no focal deficits noted  Skin: Skin is warm and dry. not diaphoretic.   Psychiatric:  normal mood and affect.  behavior is normal.    Nursing note and vitals reviewed.    LABS  CBC    Recent Labs  Lab 02/19/18  1522   WBC 7.91   RBC 4.24   HGB 10.6*   HCT 33.3*   *   MCV 79*   MCH 25.0*   MCHC 31.8*     BMP    Recent Labs  Lab 02/19/18  1522      K 4.1   CO2 23      BUN 13   CREATININE 0.8   GLU 93     POCT-Glucose  POCT Glucose   Date Value Ref Range Status   02/20/2018 81 70 - 110 mg/dL Final   02/20/2018 108 70 - 110 mg/dL Final       Recent Labs  Lab 02/19/18  1522   CALCIUM 9.1     LFT    Recent Labs  Lab 02/19/18  1522   PROT 7.3   ALBUMIN 3.4*   BILITOT 0.3   AST 12   ALKPHOS 110   ALT 7*     LAST HbA1c  Lab Results   Component Value Date    HGBA1C 5.2 02/19/2018       A/P: 22 y/o AAF w/ acute cholecystitis.    - plan for OR today for lap jesus   - continue NPO   - continue IVF  - continue IV zosyn    Diet: NPO   Ppx: scd  Code: full  Dispo: to OR today    Jane Waggoner D.O.  [facility] HO-2  02/20/2018

## 2018-02-20 NOTE — PLAN OF CARE
Problem: Patient Care Overview  Goal: Plan of Care Review  Outcome: Revised  Patient is awake, alert, and oriented.  Ambulated in hallway with Mobility Tech.  Tolerates all meals without any nausea or vomiting.  Denies pain or shortness of breath.  Safety maintained.  Instructed on nothing by mouth after midnight for surgery in am.  Verbalized understanding. Will continue to monitor.

## 2018-02-20 NOTE — ED NOTES
Surgery resident called for few more admit orders in order to get an admit bed on floor and pending

## 2018-02-20 NOTE — PLAN OF CARE
Recommendations     Recommendation/Intervention:   1. Advance diet to 2000 ADA   2. Provided diet education on DM/weight loss with handouts and f/u resources.   3. RD to monitor     Goals: Meet 85% EEN  Nutrition Goal Status: new  Communication of RD Recs: reviewed with RN     Continuum of Care Plan     Referral to Outpatient Services:  (D/C planning: ADA diet)

## 2018-02-20 NOTE — ED NOTES
Iv fluid patent to right arm and will continue to the floor; pt resting quietly; npo for or in am amnd consents signed and at bedside and will go to floor with pt

## 2018-02-20 NOTE — PLAN OF CARE
Problem: Patient Care Overview  Goal: Plan of Care Review  Outcome: Ongoing (interventions implemented as appropriate)  AAOX3. Respirations even and unlabored; breath sounds clear.  RA with saturation %.  Denies n/v and sob.  C/o RUQ abd pain /10; adminstered with pain relief.  Tolerated regular diet and was instructed on NPO after midnight.  Skin intact.  Attempted to place TEDS on patient but unable; SCDS on.  Ambulating to bathroom with standby assistance.  Fall precaution and allergy armbands on.  Fall precaution sign on door.  Mother at bedside.  Instructed to call for assistance.  Will cont to monitor.

## 2018-02-21 VITALS
HEART RATE: 107 BPM | BODY MASS INDEX: 45.99 KG/M2 | WEIGHT: 293 LBS | SYSTOLIC BLOOD PRESSURE: 124 MMHG | DIASTOLIC BLOOD PRESSURE: 67 MMHG | TEMPERATURE: 98 F | RESPIRATION RATE: 20 BRPM | OXYGEN SATURATION: 99 % | HEIGHT: 67 IN

## 2018-02-21 LAB
POCT GLUCOSE: 100 MG/DL (ref 70–110)
POCT GLUCOSE: 103 MG/DL (ref 70–110)
POCT GLUCOSE: 109 MG/DL (ref 70–110)

## 2018-02-21 PROCEDURE — 25000003 PHARM REV CODE 250: Performed by: NURSE ANESTHETIST, CERTIFIED REGISTERED

## 2018-02-21 PROCEDURE — 00790 ANES IPER UPR ABD NOS: CPT | Performed by: SURGERY

## 2018-02-21 PROCEDURE — 63600175 PHARM REV CODE 636 W HCPCS: Performed by: ANESTHESIOLOGY

## 2018-02-21 PROCEDURE — G0378 HOSPITAL OBSERVATION PER HR: HCPCS

## 2018-02-21 PROCEDURE — 88304 TISSUE EXAM BY PATHOLOGIST: CPT | Mod: 26,,, | Performed by: PATHOLOGY

## 2018-02-21 PROCEDURE — 63600175 PHARM REV CODE 636 W HCPCS: Performed by: NURSE ANESTHETIST, CERTIFIED REGISTERED

## 2018-02-21 PROCEDURE — 63600175 PHARM REV CODE 636 W HCPCS: Performed by: SURGERY

## 2018-02-21 PROCEDURE — 71000033 HC RECOVERY, INTIAL HOUR: Performed by: SURGERY

## 2018-02-21 PROCEDURE — 63600175 PHARM REV CODE 636 W HCPCS: Performed by: STUDENT IN AN ORGANIZED HEALTH CARE EDUCATION/TRAINING PROGRAM

## 2018-02-21 PROCEDURE — 25000003 PHARM REV CODE 250: Performed by: SURGERY

## 2018-02-21 PROCEDURE — 37000008 HC ANESTHESIA 1ST 15 MINUTES: Performed by: SURGERY

## 2018-02-21 PROCEDURE — 25000003 PHARM REV CODE 250: Performed by: STUDENT IN AN ORGANIZED HEALTH CARE EDUCATION/TRAINING PROGRAM

## 2018-02-21 PROCEDURE — C9290 INJ, BUPIVACAINE LIPOSOME: HCPCS | Performed by: SURGERY

## 2018-02-21 PROCEDURE — 71000039 HC RECOVERY, EACH ADD'L HOUR: Performed by: SURGERY

## 2018-02-21 PROCEDURE — 37000009 HC ANESTHESIA EA ADD 15 MINS: Performed by: SURGERY

## 2018-02-21 PROCEDURE — 27000221 HC OXYGEN, UP TO 24 HOURS

## 2018-02-21 PROCEDURE — 94761 N-INVAS EAR/PLS OXIMETRY MLT: CPT

## 2018-02-21 PROCEDURE — 88304 TISSUE EXAM BY PATHOLOGIST: CPT | Performed by: PATHOLOGY

## 2018-02-21 PROCEDURE — 27201423 OPTIME MED/SURG SUP & DEVICES STERILE SUPPLY: Performed by: SURGERY

## 2018-02-21 PROCEDURE — 36000708 HC OR TIME LEV III 1ST 15 MIN: Performed by: SURGERY

## 2018-02-21 PROCEDURE — 36000709 HC OR TIME LEV III EA ADD 15 MIN: Performed by: SURGERY

## 2018-02-21 RX ORDER — NEOSTIGMINE METHYLSULFATE 1 MG/ML
INJECTION, SOLUTION INTRAVENOUS
Status: DISCONTINUED | OUTPATIENT
Start: 2018-02-21 | End: 2018-02-21

## 2018-02-21 RX ORDER — PHENYLEPHRINE HYDROCHLORIDE 10 MG/ML
INJECTION INTRAVENOUS
Status: DISCONTINUED | OUTPATIENT
Start: 2018-02-21 | End: 2018-02-21

## 2018-02-21 RX ORDER — KETOROLAC TROMETHAMINE 30 MG/ML
15 INJECTION, SOLUTION INTRAMUSCULAR; INTRAVENOUS EVERY 6 HOURS
Status: DISCONTINUED | OUTPATIENT
Start: 2018-02-21 | End: 2018-02-21 | Stop reason: HOSPADM

## 2018-02-21 RX ORDER — BUPIVACAINE HYDROCHLORIDE 2.5 MG/ML
INJECTION, SOLUTION EPIDURAL; INFILTRATION; INTRACAUDAL
Status: DISCONTINUED | OUTPATIENT
Start: 2018-02-21 | End: 2018-02-21 | Stop reason: HOSPADM

## 2018-02-21 RX ORDER — FENTANYL CITRATE 50 UG/ML
INJECTION, SOLUTION INTRAMUSCULAR; INTRAVENOUS
Status: DISCONTINUED | OUTPATIENT
Start: 2018-02-21 | End: 2018-02-21

## 2018-02-21 RX ORDER — HYDROMORPHONE HYDROCHLORIDE 2 MG/ML
INJECTION, SOLUTION INTRAMUSCULAR; INTRAVENOUS; SUBCUTANEOUS
Status: DISCONTINUED
Start: 2018-02-21 | End: 2018-02-21 | Stop reason: HOSPADM

## 2018-02-21 RX ORDER — GLYCOPYRROLATE 0.2 MG/ML
INJECTION INTRAMUSCULAR; INTRAVENOUS
Status: DISCONTINUED | OUTPATIENT
Start: 2018-02-21 | End: 2018-02-21

## 2018-02-21 RX ORDER — SODIUM CHLORIDE 0.9 % (FLUSH) 0.9 %
3 SYRINGE (ML) INJECTION
Status: DISCONTINUED | OUTPATIENT
Start: 2018-02-21 | End: 2018-02-21

## 2018-02-21 RX ORDER — ONDANSETRON 2 MG/ML
4 INJECTION INTRAMUSCULAR; INTRAVENOUS ONCE AS NEEDED
Status: DISCONTINUED | OUTPATIENT
Start: 2018-02-21 | End: 2018-02-21 | Stop reason: HOSPADM

## 2018-02-21 RX ORDER — HYDROMORPHONE HYDROCHLORIDE 2 MG/ML
0.5 INJECTION, SOLUTION INTRAMUSCULAR; INTRAVENOUS; SUBCUTANEOUS EVERY 6 HOURS PRN
Status: DISCONTINUED | OUTPATIENT
Start: 2018-02-21 | End: 2018-02-21 | Stop reason: HOSPADM

## 2018-02-21 RX ORDER — SODIUM CHLORIDE 0.9 % (FLUSH) 0.9 %
3 SYRINGE (ML) INJECTION EVERY 8 HOURS
Status: DISCONTINUED | OUTPATIENT
Start: 2018-02-21 | End: 2018-02-21 | Stop reason: HOSPADM

## 2018-02-21 RX ORDER — LIDOCAINE HCL/PF 100 MG/5ML
SYRINGE (ML) INTRAVENOUS
Status: DISCONTINUED | OUTPATIENT
Start: 2018-02-21 | End: 2018-02-21

## 2018-02-21 RX ORDER — PROPOFOL 10 MG/ML
VIAL (ML) INTRAVENOUS
Status: DISCONTINUED | OUTPATIENT
Start: 2018-02-21 | End: 2018-02-21

## 2018-02-21 RX ORDER — SUCCINYLCHOLINE CHLORIDE 20 MG/ML
INJECTION INTRAMUSCULAR; INTRAVENOUS
Status: DISCONTINUED | OUTPATIENT
Start: 2018-02-21 | End: 2018-02-21

## 2018-02-21 RX ORDER — HYDROCODONE BITARTRATE AND ACETAMINOPHEN 7.5; 325 MG/1; MG/1
1 TABLET ORAL EVERY 6 HOURS PRN
Qty: 25 TABLET | Refills: 0 | Status: SHIPPED | OUTPATIENT
Start: 2018-02-21 | End: 2018-02-21

## 2018-02-21 RX ORDER — CEFAZOLIN SODIUM 1 G/3ML
INJECTION, POWDER, FOR SOLUTION INTRAMUSCULAR; INTRAVENOUS
Status: DISCONTINUED | OUTPATIENT
Start: 2018-02-21 | End: 2018-02-21

## 2018-02-21 RX ORDER — SODIUM CHLORIDE, SODIUM LACTATE, POTASSIUM CHLORIDE, CALCIUM CHLORIDE 600; 310; 30; 20 MG/100ML; MG/100ML; MG/100ML; MG/100ML
INJECTION, SOLUTION INTRAVENOUS CONTINUOUS PRN
Status: DISCONTINUED | OUTPATIENT
Start: 2018-02-21 | End: 2018-02-21

## 2018-02-21 RX ORDER — HYDROCODONE BITARTRATE AND ACETAMINOPHEN 7.5; 325 MG/1; MG/1
1 TABLET ORAL EVERY 6 HOURS PRN
Qty: 25 TABLET | Refills: 0 | Status: SHIPPED | OUTPATIENT
Start: 2018-02-21

## 2018-02-21 RX ORDER — MIDAZOLAM HYDROCHLORIDE 1 MG/ML
INJECTION, SOLUTION INTRAMUSCULAR; INTRAVENOUS
Status: DISCONTINUED | OUTPATIENT
Start: 2018-02-21 | End: 2018-02-21

## 2018-02-21 RX ORDER — SODIUM CHLORIDE 9 MG/ML
INJECTION, SOLUTION INTRAVENOUS CONTINUOUS
Status: DISCONTINUED | OUTPATIENT
Start: 2018-02-21 | End: 2018-02-21 | Stop reason: HOSPADM

## 2018-02-21 RX ORDER — HYDROMORPHONE HYDROCHLORIDE 2 MG/ML
INJECTION, SOLUTION INTRAMUSCULAR; INTRAVENOUS; SUBCUTANEOUS
Status: DISCONTINUED | OUTPATIENT
Start: 2018-02-21 | End: 2018-02-21

## 2018-02-21 RX ORDER — HYDROMORPHONE HYDROCHLORIDE 2 MG/ML
0.5 INJECTION, SOLUTION INTRAMUSCULAR; INTRAVENOUS; SUBCUTANEOUS EVERY 5 MIN PRN
Status: DISCONTINUED | OUTPATIENT
Start: 2018-02-21 | End: 2018-02-21 | Stop reason: HOSPADM

## 2018-02-21 RX ORDER — ROCURONIUM BROMIDE 10 MG/ML
INJECTION, SOLUTION INTRAVENOUS
Status: DISCONTINUED | OUTPATIENT
Start: 2018-02-21 | End: 2018-02-21

## 2018-02-21 RX ADMIN — PHENYLEPHRINE HYDROCHLORIDE 100 MCG: 10 INJECTION INTRAVENOUS at 09:02

## 2018-02-21 RX ADMIN — GLYCOPYRROLATE 0.6 MG: 0.2 INJECTION, SOLUTION INTRAMUSCULAR; INTRAVENOUS at 09:02

## 2018-02-21 RX ADMIN — DEXTROSE MONOHYDRATE, SODIUM CHLORIDE, AND POTASSIUM CHLORIDE: 50; 4.5; 1.49 INJECTION, SOLUTION INTRAVENOUS at 05:02

## 2018-02-21 RX ADMIN — HYDROMORPHONE HYDROCHLORIDE 0.4 MG: 2 INJECTION INTRAMUSCULAR; INTRAVENOUS; SUBCUTANEOUS at 10:02

## 2018-02-21 RX ADMIN — MIDAZOLAM 5 MG: 1 INJECTION INTRAMUSCULAR; INTRAVENOUS at 08:02

## 2018-02-21 RX ADMIN — NEOSTIGMINE METHYLSULFATE 3 MG: 1 INJECTION INTRAVENOUS at 09:02

## 2018-02-21 RX ADMIN — PHENYLEPHRINE HYDROCHLORIDE 100 MCG: 10 INJECTION INTRAVENOUS at 08:02

## 2018-02-21 RX ADMIN — HYDROMORPHONE HYDROCHLORIDE 0.5 MG: 2 INJECTION, SOLUTION INTRAMUSCULAR; INTRAVENOUS; SUBCUTANEOUS at 11:02

## 2018-02-21 RX ADMIN — HYDROCODONE BITARTRATE AND ACETAMINOPHEN 1 TABLET: 7.5; 325 TABLET ORAL at 01:02

## 2018-02-21 RX ADMIN — ROCURONIUM BROMIDE 30 MG: 10 INJECTION, SOLUTION INTRAVENOUS at 08:02

## 2018-02-21 RX ADMIN — LIDOCAINE HYDROCHLORIDE 80 MG: 20 INJECTION, SOLUTION INTRAVENOUS at 08:02

## 2018-02-21 RX ADMIN — FENTANYL CITRATE 250 MCG: 50 INJECTION, SOLUTION INTRAMUSCULAR; INTRAVENOUS at 08:02

## 2018-02-21 RX ADMIN — ONDANSETRON 4 MG: 2 INJECTION, SOLUTION INTRAMUSCULAR; INTRAVENOUS at 09:02

## 2018-02-21 RX ADMIN — HYDROMORPHONE HYDROCHLORIDE 0.2 MG: 2 INJECTION INTRAMUSCULAR; INTRAVENOUS; SUBCUTANEOUS at 09:02

## 2018-02-21 RX ADMIN — CEFAZOLIN 3 G: 330 INJECTION, POWDER, FOR SOLUTION INTRAMUSCULAR; INTRAVENOUS at 08:02

## 2018-02-21 RX ADMIN — HYDROMORPHONE HYDROCHLORIDE 0.4 MG: 2 INJECTION INTRAMUSCULAR; INTRAVENOUS; SUBCUTANEOUS at 09:02

## 2018-02-21 RX ADMIN — PROPOFOL 90 MG: 10 INJECTION, EMULSION INTRAVENOUS at 08:02

## 2018-02-21 RX ADMIN — FENTANYL CITRATE 100 MCG: 50 INJECTION, SOLUTION INTRAMUSCULAR; INTRAVENOUS at 08:02

## 2018-02-21 RX ADMIN — SODIUM CHLORIDE, SODIUM LACTATE, POTASSIUM CHLORIDE, AND CALCIUM CHLORIDE: .6; .31; .03; .02 INJECTION, SOLUTION INTRAVENOUS at 08:02

## 2018-02-21 RX ADMIN — KETOROLAC TROMETHAMINE 15 MG: 30 INJECTION, SOLUTION INTRAMUSCULAR; INTRAVENOUS at 12:02

## 2018-02-21 RX ADMIN — HYDROMORPHONE HYDROCHLORIDE 0.2 MG: 2 INJECTION INTRAMUSCULAR; INTRAVENOUS; SUBCUTANEOUS at 10:02

## 2018-02-21 RX ADMIN — SUCCINYLCHOLINE CHLORIDE 140 MG: 20 INJECTION, SOLUTION INTRAMUSCULAR; INTRAVENOUS at 08:02

## 2018-02-21 NOTE — OP NOTE
Preoop: acute cholecystitis    Postop acute calculous cholecystitis with obesity    Proce: lap jesus    LYNDSAY Hickey/Lauren/ Gracia  EBL: minimal  To PACU, stable    Specimen: gallbladder  Complications: none  [id number]

## 2018-02-21 NOTE — OP NOTE
DATE OF PROCEDURE:  02/21/2018.    PREOPERATIVE DIAGNOSIS:  Acute calculous cholecystitis with multiple episodes of   biliary colic.    POSTOPERATIVE DIAGNOSES:  Acute calculous cholecystitis with multiple episodes   of biliary colic with obesity.    PROCEDURES DONE:  Laparoscopic cholecystectomy.    ANESTHESIA:  General endotracheal anesthesia.    SURGEON:  Carlo Rivera M.D.    SENIOR RESIDENT:  Dr. Angélica Castaneda.    SURGERY RESIDENT:  Dr. Jean Fagan.    ANESTHESIA:  Endotracheal anesthesia.    BLOOD LOSS:  Minimal.    The patient is stable and transferred to the Recovery Room.    SPECIMEN:  Gallbladder.    COMPLICATIONS:  None.    HISTORY AND INDICATION:  This is a 23-year-old female with a history of diabetes   and moderate obesity, came to the Emergency Room day before yesterday with a   severe right upper quadrant pain.  The patient had multiple episodes of biliary   colic requiring about 4 ER visits in the last one month time.  Previous   evaluation showed gallstones by ultrasound.  The patient was admitted.  I talked   to her about the risk and benefits of the surgery.  I went over with her about   the risk such as bleeding, infection, DVT, PE, MI, stroke, bile duct injury   requiring more surgery.    PROCEDURES:  Incisional hernias, etc.    FINDINGS:  The patient had an acute on chronic calculous cholecystitis.    PROCEDURE IN DETAIL:  The patient was brought to the Operating Room, was placed   in the supine position.  Prior to bringing her to the OR, it was made sure that   she has voided.  She was given sedation and intubated.  The abdomen was prepped   and draped in the usual sterile manner.  A time-out was done to verify the ID of   the patient and the procedure.  A small incision was made supraumbilically.    Veress needle was inserted into the abdominal cavity and the abdomen was   insufflated with CO2.  After adequate insufflation, a 5-mm port was placed under   direct vision into the  abdominal cavity using Optiview trocar and with a scope   in vision.  Under direct vision, an 11 mm port in the epigastric area and two   5-mm ports in the right upper quadrant were placed.  The patient was then placed   in the reverse Trendelenburg position with the right side elevated.    The gallbladder was found to be inflamed consistent with acute on chronic   cholecystitis.  The fundus was retracted superiorly into the right side.  The   Shannon's pouch was also retracted superiorly into the right side.  The cystic   duct and cystic artery was well dissected and isolated.  The critical view was   well appreciated.  Two proximal clips were placed on the cystic duct and one   distally and was transected in between.  The cystic artery was a little   prominent and 2 proximal clips were placed and 1 distally and was transected in   between.  The gallbladder was taken off from the liver bed carefully in a   hemostatic fashion.  The gallbladder was placed in an Endobag and was retrieved   outside.  After ensuring adequate hemostasis, the whole area was irrigated and   drained completely.  The ports were all withdrawn.  The epigastric fascial   incision was approximated using 0 Vicryl stitch.  Skin was closed using 4-0   Monocryl and Dermabond was applied over the area.  The patient was stable,   extubated and taken to Recovery Room in stable condition.      LEROY  dd: 02/21/2018 09:39:06 (CST)  td: 02/21/2018 10:38:46 (CST)  Doc ID   #[id number]  Job ID #[id number]    CC:

## 2018-02-21 NOTE — TRANSFER OF CARE
"Anesthesia Transfer of Care Note    Patient: Melissa Herrera    Procedure(s) Performed: Procedure(s) (LRB):  CHOLECYSTECTOMY-LAPAROSCOPIC (N/A)    Patient location: PACU    Anesthesia Type: general    Transport from OR: Transported from OR on 6-10 L/min O2 by face mask with adequate spontaneous ventilation    Post pain: adequate analgesia    Post assessment: no apparent anesthetic complications and tolerated procedure well    Post vital signs: stable    Level of consciousness: awake, alert and oriented    Nausea/Vomiting: no nausea/vomiting    Complications: none    Transfer of care protocol was followed      Last vitals:   Visit Vitals  BP (!) 115/56   Pulse 74   Temp 36.8 °C (98.2 °F) (Oral)   Resp 16   Ht 5' 7" (1.702 m)   Wt (!) 140.5 kg (309 lb 11.9 oz)   SpO2 96%   Breastfeeding? No   BMI 48.51 kg/m²     "

## 2018-02-21 NOTE — PROGRESS NOTES
Surgery Progress Note:     Overnight:  NAEON, Surgery held yesterday 2/2 OR availability.  NPO for surgery this AM. pain controlled, denies n/v, fever or chills.     Temp:  [96.9 °F (36.1 °C)-98.5 °F (36.9 °C)] 96.9 °F (36.1 °C)  Pulse:  [69-82] 82  Resp:  [17-19] 17  BP: ()/(53-72) 123/65      Intake/Output Summary (Last 24 hours) at 02/21/18 0649  Last data filed at 02/21/18 0500   Gross per 24 hour   Intake          3843.75 ml   Output              200 ml   Net          3643.75 ml         Physical Exam   Constitutional:  Awake, alert and oriented x 3, NAD  HENT: ncat, mmm, perrla, eomi, neck normal rom and supple   Cardiovascular: RRR no mrg  Pulmonary/Chest: Effort normal, CTA b/l no wheezes, rales, rhonchi   Abdominal: Soft. +bs, mildly ttp  Musculoskeletal: Normal ROM.  no edema.   Neurological:  AAOx3, no focal deficits noted  Skin: Skin is warm and dry. not diaphoretic.   Psychiatric:  normal mood and affect.  behavior is normal.    Nursing note and vitals reviewed.    LABS  CBC    Recent Labs  Lab 02/19/18  1522   WBC 7.91   RBC 4.24   HGB 10.6*   HCT 33.3*   *   MCV 79*   MCH 25.0*   MCHC 31.8*     BMP    Recent Labs  Lab 02/19/18  1522      K 4.1   CO2 23      BUN 13   CREATININE 0.8   GLU 93       Recent Labs  Lab 02/19/18  1522   CALCIUM 9.1     LFT    Recent Labs  Lab 02/19/18  1522   PROT 7.3   ALBUMIN 3.4*   BILITOT 0.3   AST 12   ALKPHOS 110   ALT 7*     LAST HbA1c  Lab Results   Component Value Date    HGBA1C 5.2 02/19/2018       A/P: 24 y/o AAF w/ acute cholecystitis.    - plan for OR today for lap jesus   - continue NPO   - continue IVF  - continue IV zosyn    Diet: NPO   Ppx: scd  Code: full  Dispo: to OR today    Jane Waggoner D.O.  [facility] HO-2  02/21/2018

## 2018-02-21 NOTE — PROGRESS NOTES
Pt arrived to unit from PACU via stretcher. Drowsy, Ox4. VSS. NAD noted. Pt arrived with O2 @ 2L. Bedrails up x 2. Bed low and locked, alarm on. Fall precautions maintained. Call light within reach. See full documented assessment on pertinent flowsheets. Will continue to monitor.

## 2018-02-21 NOTE — PLAN OF CARE
Patient sleeping, arousable. VSS. C/O pain when woken, falls asleep. Dr Jesus mitchell to release patient from PACU. Report to Ginette VILLEGAS with time for questions, verbalized understanding

## 2018-02-21 NOTE — PLAN OF CARE
Problem: Patient Care Overview  Goal: Plan of Care Review  Outcome: Ongoing (interventions implemented as appropriate)  Patient resting in bed, AAOx4. Family at the bedside. IVF infusing as ordered. Medications administered as ordered. Patient complained of some abdominal pain overnight, PRN medication administered. Asleep for majority of shift. Encouraged to call with needs or concerns. Will continue to monitor.

## 2018-02-21 NOTE — PLAN OF CARE
pt for d/c to home today   pt resides with mother who will assist as needed at home     Follow-Up       Follow-up With  Details  Why  Contact Info   Miguel Hickey MD  Schedule an appointment as soon as possible for a visit on 3/15/2018  11:00 am   [address]  [address]  [place] [address]  [phone]   Kathleen Rashid MD  Call on 2/27/2018  11:00 am   [address]  [address]  [place] [address]  [phone]               02/21/18 1509   Final Note   Assessment Type Final Discharge Note   Discharge Disposition Home   What phone number can be called within the next 1-3 days to see how you are doing after discharge? [phone]   Discharge plans and expectations educations in teach back method with documentation complete? Yes   Right Care Referral Info   Post Acute Recommendation No Care   Referral Type (no care )

## 2018-02-22 NOTE — ANESTHESIA POSTPROCEDURE EVALUATION
"Anesthesia Post Evaluation    Patient: Melissa Herrera    Procedure(s) Performed: Procedure(s) (LRB):  CHOLECYSTECTOMY-LAPAROSCOPIC (N/A)    Final Anesthesia Type: general  Patient location during evaluation: PACU  Patient participation: Yes- Able to Participate  Level of consciousness: awake and alert  Post-procedure vital signs: reviewed and stable  Pain management: adequate  Airway patency: patent  PONV status at discharge: No PONV  Anesthetic complications: no      Cardiovascular status: blood pressure returned to baseline and hemodynamically stable  Respiratory status: unassisted  Hydration status: euvolemic  Follow-up not needed.        Visit Vitals  /67 (Patient Position: Lying)   Pulse 107   Temp 36.8 °C (98.3 °F) (Oral)   Resp 20   Ht 5' 7" (1.702 m)   Wt (!) 140.5 kg (309 lb 11.9 oz)   SpO2 99%   Breastfeeding? No   BMI 48.51 kg/m²       Pain/Samson Score: Pain Assessment Performed: Yes (2/21/2018  5:00 PM)  Presence of Pain: denies (2/21/2018  5:00 PM)  Pain Rating Prior to Med Admin: 9 (2/21/2018  1:45 PM)  Pain Rating Post Med Admin: 0 (2/21/2018  3:00 PM)  Samson Score: 8 (2/21/2018 11:54 AM)      "

## 2018-02-22 NOTE — DISCHARGE SUMMARY
[facility]  Discharge Summary      Admit Date: 2/19/2018    Discharge Date and Time: 2/21/2018  6:30 PM    Attending Physician: YAIR BUCKLEY    Reason for Admission: acute cholecystitis    Procedures Performed: Procedure(s) (LRB):  CHOLECYSTECTOMY-LAPAROSCOPIC (N/A)    Hospital Course (synopsis of major diagnoses, care, treatment, and services provided during the course of the hospital stay):     22 yo obese AAF with pmh of asthma and multiple recent ED visits for abdominal pain presented to ED c/o RUQ pain and was found to have Cholelithiasis on US and was admitted for acute cholecysitis. Pt was made NPO and IV fluids and pain medications were ordered. Patient was brought to OR for Lap Carmenza without complication (see op note for detailed report).  She did well post-op, pain controlled with medication, tolerating diet without n/v, ambulating well. Patient was hemodynamically stable and deemed appropriate for discharge. Patient will follow up with PCP and GS as listed below.       Significant Diagnostic Studies:   US ab limited: Cholelithiasis with slight increased prominence of the common duct measuring 5 mm with nonmobile stone in the region of the gallbladder neck. Cannot exclude early cholecystitis or intermittent obstruction. However no gallbladder wall thickening or pericholecystic fluid. Clinical correlation and further evaluation with nuclear medicine imaging as warranted.    Final Diagnoses:    Principal Problem: Acute cholecystitis   Secondary Diagnoses:   Active Hospital Problems    Diagnosis  POA    *Acute cholecystitis [K81.0]  Yes    Recurrent biliary colic [K80.50]  Yes      Resolved Hospital Problems    Diagnosis Date Resolved POA   No resolved problems to display.       Discharged Condition: good    Disposition: Home or Self Care    Follow Up/Patient Instructions:   - no lifting > 10 lbs for 3 weeks  - no driving while taking pain medications  - f/u 3  weeks    Medications:  Reconciled Home Medications:   Discharge Medication List as of 2/21/2018  4:27 PM      CONTINUE these medications which have CHANGED    Details   hydrocodone-acetaminophen 7.5-325mg (NORCO) 7.5-325 mg per tablet Take 1 tablet by mouth every 6 (six) hours as needed., Starting Wed 2/21/2018, Print         CONTINUE these medications which have NOT CHANGED    Details   ferrous sulfate 324 mg (65 mg iron) TbEC Starting Mon 12/11/2017, Historical Med      metformin (GLUCOPHAGE) 500 MG tablet Take 500 mg by mouth once daily. Pt did not bring med bottles with her., Until Discontinued, Historical Med      montelukast (SINGULAIR) 10 mg tablet Starting Mon 12/11/2017, Historical Med      naproxen (NAPROSYN) 500 MG tablet Take 1 tablet (500 mg total) by mouth 2 (two) times daily with meals. PRN pain, Starting Tue 2/13/2018, Print      ondansetron (ZOFRAN-ODT) 4 MG TbDL Take 2 tablets (8 mg total) by mouth every 8 (eight) hours as needed (nausea/vomiting)., Starting Tue 2/13/2018, Print         STOP taking these medications       hydrocodone-acetaminophen 5-325mg (NORCO) 5-325 mg per tablet Comments:   Reason for Stopping:         IBUPROFEN ORAL Comments:   Reason for Stopping:               Discharge Procedure Orders  Diet Adult Regular     Activity as tolerated     Lifting restrictions   Order Comments: No lifting > 10 lbs for 3 weeks     No driving until:   Order Specific Question Answer Comments   Date: 2/28/2018      Notify your health care provider if you experience any of the following:  temperature >100.4     Notify your health care provider if you experience any of the following:  persistent nausea and vomiting or diarrhea     Notify your health care provider if you experience any of the following:  severe uncontrolled pain     Notify your health care provider if you experience any of the following:  redness, tenderness, or signs of infection (pain, swelling, redness, odor or green/yellow  discharge around incision site)     No dressing needed       Follow-up Information     Miguel Hickey MD. Schedule an appointment as soon as possible for a visit on 3/15/2018.    Specialty:  General Surgery  Why:  11:00 am       Contact information:  [address]  [address]  [place] [address]  [phone]             Kathleen Rashid MD. Call on 2/27/2018.    Specialty:  Internal Medicine  Why:  11:00 am      Contact information:  [address]  [address]  [place] [address]  [phone]                 Jane Waggoner D.O.  [facility] HO-2  02/22/2018

## 2018-03-15 ENCOUNTER — OFFICE VISIT (OUTPATIENT)
Dept: NEUROLOGY | Facility: HOSPITAL | Age: 23
End: 2018-03-15
Attending: SURGERY
Payer: MEDICAID

## 2018-03-15 VITALS
SYSTOLIC BLOOD PRESSURE: 121 MMHG | WEIGHT: 293 LBS | TEMPERATURE: 97 F | BODY MASS INDEX: 45.99 KG/M2 | HEIGHT: 67 IN | HEART RATE: 102 BPM | DIASTOLIC BLOOD PRESSURE: 73 MMHG

## 2018-03-15 DIAGNOSIS — Z09 POSTOP CHECK: Primary | ICD-10-CM

## 2018-03-15 PROCEDURE — 99213 OFFICE O/P EST LOW 20 MIN: CPT | Performed by: SURGERY

## 2018-03-15 NOTE — PROGRESS NOTES
S/p lap jesus     Doing well    abd soft wound looks good    F/u 3 months    To seee PCP  Recom: loose weight

## 2021-02-14 ENCOUNTER — HOSPITAL ENCOUNTER (EMERGENCY)
Facility: HOSPITAL | Age: 26
Discharge: HOME | End: 2021-02-14
Attending: EMERGENCY MEDICINE
Payer: MEDICAID

## 2021-02-14 VITALS
WEIGHT: 293 LBS | RESPIRATION RATE: 18 BRPM | TEMPERATURE: 99 F | DIASTOLIC BLOOD PRESSURE: 82 MMHG | BODY MASS INDEX: 45.99 KG/M2 | SYSTOLIC BLOOD PRESSURE: 135 MMHG | OXYGEN SATURATION: 100 % | HEIGHT: 67 IN | HEART RATE: 104 BPM

## 2021-02-14 DIAGNOSIS — J02.9 PHARYNGITIS, UNSPECIFIED ETIOLOGY: Primary | ICD-10-CM

## 2021-02-14 LAB — GROUP A STREP, MOLECULAR: NEGATIVE

## 2021-02-14 PROCEDURE — 87651 STREP A DNA AMP PROBE: CPT

## 2021-02-14 PROCEDURE — 99282 EMERGENCY DEPT VISIT SF MDM: CPT

## 2021-08-02 ENCOUNTER — IMMUNIZATION (OUTPATIENT)
Dept: PHARMACY | Facility: CLINIC | Age: 26
End: 2021-08-02
Payer: MEDICAID

## 2021-08-02 ENCOUNTER — LAB VISIT (OUTPATIENT)
Dept: LAB | Facility: HOSPITAL | Age: 26
End: 2021-08-02
Attending: INTERNAL MEDICINE
Payer: MEDICAID

## 2021-08-02 DIAGNOSIS — E78.2 MIXED HYPERLIPIDEMIA: Primary | ICD-10-CM

## 2021-08-02 DIAGNOSIS — E55.9 AVITAMINOSIS D: ICD-10-CM

## 2021-08-02 DIAGNOSIS — H53.8 VISUAL DISORIENTATION SYNDROME: Primary | ICD-10-CM

## 2021-08-02 DIAGNOSIS — E53.8 BIOTIN-(PROPIONYL-COA-CARBOXYLASE) LIGASE DEFICIENCY: ICD-10-CM

## 2021-08-02 DIAGNOSIS — R73.9 BLOOD GLUCOSE ELEVATED: ICD-10-CM

## 2021-08-02 DIAGNOSIS — M15.9 GENERALIZED OSTEOARTHROSIS, INVOLVING MULTIPLE SITES: ICD-10-CM

## 2021-08-02 DIAGNOSIS — D64.9 ANEMIA, UNSPECIFIED: ICD-10-CM

## 2021-08-02 LAB
ALBUMIN SERPL BCP-MCNC: 3 G/DL (ref 3.5–5.2)
ALP SERPL-CCNC: 117 U/L (ref 55–135)
ALT SERPL W/O P-5'-P-CCNC: 9 U/L (ref 10–44)
ANION GAP SERPL CALC-SCNC: 11 MMOL/L (ref 8–16)
AST SERPL-CCNC: 12 U/L (ref 10–40)
BASOPHILS # BLD AUTO: 0.05 K/UL (ref 0–0.2)
BASOPHILS NFR BLD: 0.7 % (ref 0–1.9)
BILIRUB SERPL-MCNC: 0.2 MG/DL (ref 0.1–1)
BUN SERPL-MCNC: 11 MG/DL (ref 6–20)
CALCIUM SERPL-MCNC: 9 MG/DL (ref 8.7–10.5)
CHLORIDE SERPL-SCNC: 106 MMOL/L (ref 95–110)
CHOLEST SERPL-MCNC: 137 MG/DL (ref 120–199)
CHOLEST/HDLC SERPL: 3.2 {RATIO} (ref 2–5)
CO2 SERPL-SCNC: 21 MMOL/L (ref 23–29)
CREAT SERPL-MCNC: 0.7 MG/DL (ref 0.5–1.4)
DIFFERENTIAL METHOD: ABNORMAL
EOSINOPHIL # BLD AUTO: 0.2 K/UL (ref 0–0.5)
EOSINOPHIL NFR BLD: 3 % (ref 0–8)
ERYTHROCYTE [DISTWIDTH] IN BLOOD BY AUTOMATED COUNT: 14.6 % (ref 11.5–14.5)
EST. GFR  (AFRICAN AMERICAN): >60 ML/MIN/1.73 M^2
EST. GFR  (NON AFRICAN AMERICAN): >60 ML/MIN/1.73 M^2
ESTIMATED AVG GLUCOSE: 120 MG/DL (ref 68–131)
FERRITIN SERPL-MCNC: 17 NG/ML (ref 20–300)
GLUCOSE SERPL-MCNC: 105 MG/DL (ref 70–110)
HBA1C MFR BLD: 5.8 % (ref 4–5.6)
HCT VFR BLD AUTO: 35 % (ref 37–48.5)
HDLC SERPL-MCNC: 43 MG/DL (ref 40–75)
HDLC SERPL: 31.4 % (ref 20–50)
HGB BLD-MCNC: 11.1 G/DL (ref 12–16)
IMM GRANULOCYTES # BLD AUTO: 0.02 K/UL (ref 0–0.04)
IMM GRANULOCYTES NFR BLD AUTO: 0.3 % (ref 0–0.5)
LDLC SERPL CALC-MCNC: 82.6 MG/DL (ref 63–159)
LYMPHOCYTES # BLD AUTO: 2.4 K/UL (ref 1–4.8)
LYMPHOCYTES NFR BLD: 34.5 % (ref 18–48)
MCH RBC QN AUTO: 25 PG (ref 27–31)
MCHC RBC AUTO-ENTMCNC: 31.7 G/DL (ref 32–36)
MCV RBC AUTO: 79 FL (ref 82–98)
MONOCYTES # BLD AUTO: 0.6 K/UL (ref 0.3–1)
MONOCYTES NFR BLD: 9.1 % (ref 4–15)
NEUTROPHILS # BLD AUTO: 3.7 K/UL (ref 1.8–7.7)
NEUTROPHILS NFR BLD: 52.4 % (ref 38–73)
NONHDLC SERPL-MCNC: 94 MG/DL
NRBC BLD-RTO: 0 /100 WBC
PLATELET # BLD AUTO: 501 K/UL (ref 150–450)
PMV BLD AUTO: 9.7 FL (ref 9.2–12.9)
POTASSIUM SERPL-SCNC: 3.6 MMOL/L (ref 3.5–5.1)
PROT SERPL-MCNC: 7.5 G/DL (ref 6–8.4)
RBC # BLD AUTO: 4.44 M/UL (ref 4–5.4)
SODIUM SERPL-SCNC: 138 MMOL/L (ref 136–145)
TRIGL SERPL-MCNC: 57 MG/DL (ref 30–150)
TSH SERPL DL<=0.005 MIU/L-ACNC: 1.92 UIU/ML (ref 0.4–4)
URATE SERPL-MCNC: 4.7 MG/DL (ref 2.4–5.7)
WBC # BLD AUTO: 7.07 K/UL (ref 3.9–12.7)

## 2021-08-02 PROCEDURE — 36415 COLL VENOUS BLD VENIPUNCTURE: CPT | Performed by: INTERNAL MEDICINE

## 2021-08-02 PROCEDURE — 82570 ASSAY OF URINE CREATININE: CPT | Performed by: INTERNAL MEDICINE

## 2021-08-02 PROCEDURE — 83036 HEMOGLOBIN GLYCOSYLATED A1C: CPT | Performed by: INTERNAL MEDICINE

## 2021-08-02 PROCEDURE — 82043 UR ALBUMIN QUANTITATIVE: CPT | Performed by: INTERNAL MEDICINE

## 2021-08-02 PROCEDURE — 82306 VITAMIN D 25 HYDROXY: CPT | Performed by: INTERNAL MEDICINE

## 2021-08-02 PROCEDURE — 80061 LIPID PANEL: CPT | Performed by: INTERNAL MEDICINE

## 2021-08-02 PROCEDURE — 85025 COMPLETE CBC W/AUTO DIFF WBC: CPT | Performed by: INTERNAL MEDICINE

## 2021-08-02 PROCEDURE — 82728 ASSAY OF FERRITIN: CPT | Performed by: INTERNAL MEDICINE

## 2021-08-02 PROCEDURE — 84443 ASSAY THYROID STIM HORMONE: CPT | Performed by: INTERNAL MEDICINE

## 2021-08-02 PROCEDURE — 84550 ASSAY OF BLOOD/URIC ACID: CPT | Performed by: INTERNAL MEDICINE

## 2021-08-02 PROCEDURE — 82607 VITAMIN B-12: CPT | Performed by: INTERNAL MEDICINE

## 2021-08-02 PROCEDURE — 80053 COMPREHEN METABOLIC PANEL: CPT | Performed by: INTERNAL MEDICINE

## 2021-08-03 LAB
25(OH)D3+25(OH)D2 SERPL-MCNC: 11 NG/ML (ref 30–96)
ALBUMIN/CREAT UR: 4.4 UG/MG (ref 0–30)
CREAT UR-MCNC: 387 MG/DL (ref 15–325)
MICROALBUMIN UR DL<=1MG/L-MCNC: 17 UG/ML
VIT B12 SERPL-MCNC: 397 PG/ML (ref 210–950)

## 2022-12-01 ENCOUNTER — HOSPITAL ENCOUNTER (EMERGENCY)
Facility: HOSPITAL | Age: 27
Discharge: HOME | End: 2022-12-01
Attending: STUDENT IN AN ORGANIZED HEALTH CARE EDUCATION/TRAINING PROGRAM
Payer: MEDICAID

## 2022-12-01 VITALS
HEART RATE: 81 BPM | WEIGHT: 293 LBS | BODY MASS INDEX: 45.99 KG/M2 | TEMPERATURE: 98 F | HEIGHT: 67 IN | SYSTOLIC BLOOD PRESSURE: 135 MMHG | RESPIRATION RATE: 20 BRPM | OXYGEN SATURATION: 100 % | DIASTOLIC BLOOD PRESSURE: 92 MMHG

## 2022-12-01 DIAGNOSIS — R07.9 CHEST PAIN: ICD-10-CM

## 2022-12-01 LAB
B-HCG UR QL: NEGATIVE
CTP QC/QA: YES

## 2022-12-01 PROCEDURE — 93010 EKG 12-LEAD: ICD-10-PCS | Mod: ,,, | Performed by: INTERNAL MEDICINE

## 2022-12-01 PROCEDURE — 99284 EMERGENCY DEPT VISIT MOD MDM: CPT | Mod: 25

## 2022-12-01 PROCEDURE — 93010 ELECTROCARDIOGRAM REPORT: CPT | Mod: ,,, | Performed by: INTERNAL MEDICINE

## 2022-12-01 PROCEDURE — 81025 URINE PREGNANCY TEST: CPT | Performed by: NURSE PRACTITIONER

## 2022-12-01 PROCEDURE — 93005 ELECTROCARDIOGRAM TRACING: CPT

## 2022-12-01 NOTE — Clinical Note
"Melissa"Marce Herrera was seen and treated in our emergency department on 12/1/2022.  She may return to work on 12/03/2022.       If you have any questions or concerns, please don't hesitate to call.      Natali Cedeño, DO"

## 2022-12-02 NOTE — ED PROVIDER NOTES
NAME:  Melissa Herrera  CSN:     [id number]  MRN:    [id number]  ADMIT DATE: 12/1/2022        eMERGENCY dEPARTMENT eNCOUnter    CHIEF COMPLAINT    Chief Complaint   Patient presents with    Chest Pain     Pt presents to ed c/o mid-sternal, sharp CP that started yesterday while lifting items at work. Pt come and goes but is worsened by physical activity with SOB. 5/10 now.        HPI    Melissa Herrera is a 27 y.o. female with a past medical history of  has a past medical history of Asthma and Diabetes mellitus.     she presents to the ED due to 2 episodes of chest pain occurring at work.  She states that yesterday when she was reaching to lift something heavy above her she had a pain in the center of her chest lasting for about a meeting that felt as if she got punched in the center the chest.  States symptoms resolved on their own shortly after.  However, today she states again while lifting something at work she noticed a sharp stabbing sensation to the middle of her chest that radiated to her back lasting only for a minute and has since resolved.  She denies any symptoms currently.  No chest pain, cough, congestion or recent illness.  Does have a primary care physician however, has not seen them in approximately 1 year.  Not currently on any daily medications.  No known injury.  No reproducible tenderness to palpation.  She is not tried anything for her symptoms. no prior history of dvt or pe, no recent trauma or surgery, no hemoptysis, no exogenous estrogens.    HPI       PAST MEDICAL HISTORY  Past Medical History:   Diagnosis Date    Asthma     Diabetes mellitus        SURGICAL HISTORY    No past surgical history on file.    FAMILY HISTORY    No family history on file.    SOCIAL HISTORY    Social History     Socioeconomic History    Marital status: Single   Tobacco Use    Smoking status: Never    Smokeless tobacco: Never   Substance and Sexual Activity    Alcohol use: No    Drug use: No    Sexual activity: Not  "Currently       MEDICATIONS  Current Outpatient Medications   Medication Instructions    ferrous sulfate 324 mg (65 mg iron) TbEC No dose, route, or frequency recorded.    hydrocodone-acetaminophen 7.5-325mg (NORCO) 7.5-325 mg per tablet 1 tablet, Oral, Every 6 hours PRN    ibuprofen (ADVIL,MOTRIN) 800 MG tablet Take 1 tablet (800 mg total) by mouth every 8 (eight) hours for abdominal cramps with food    montelukast (SINGULAIR) 10 mg tablet No dose, route, or frequency recorded.    naproxen (NAPROSYN) 500 mg, Oral, 2 times daily with meals, PRN pain    ondansetron (ZOFRAN-ODT) 8 mg, Oral, Every 8 hours PRN       ALLERGIES    Review of patient's allergies indicates:   Allergen Reactions    Grass pollen-june grass standard          REVIEW OF SYSTEMS   Review of Systems   Constitutional:  Negative for fever.   HENT:  Negative for sore throat.    Respiratory:  Negative for shortness of breath.    Cardiovascular:  Positive for chest pain.   Gastrointestinal:  Negative for nausea.   Genitourinary:  Negative for dysuria.   Musculoskeletal:  Negative for back pain.   Skin:  Negative for rash.   Neurological:  Negative for weakness.   Hematological:  Does not bruise/bleed easily.        PHYSICAL EXAM    Reviewed Triage Note    VITAL SIGNS:   ED Triage Vitals [12/01/22 1821]   Enc Vitals Group      BP (!) 135/92      Pulse 81      Resp 20      Temp 98.1 °F (36.7 °C)      Temp src Oral      SpO2 100 %      Weight (!) 320 lb      Height 5' 7"      Head Circumference       Peak Flow       Pain Score       Pain Loc       Pain Edu?       Excl. in GC?        Patient Vitals for the past 24 hrs:   BP Temp Temp src Pulse Resp SpO2 Height Weight   12/01/22 1821 (!) 135/92 98.1 °F (36.7 °C) Oral 81 20 100 % 5' 7" (1.702 m) (!) 145.2 kg (320 lb)       Physical Exam    Nursing note and vitals reviewed.  Constitutional: She appears well-developed and well-nourished.   HENT:   Head: Normocephalic and atraumatic.   Eyes: EOM are normal. " Pupils are equal, round, and reactive to light.   Neck: Neck supple.   Normal range of motion.  Cardiovascular:  Normal rate and regular rhythm.           Pulmonary/Chest: Breath sounds normal. No respiratory distress.   Abdominal: Abdomen is soft. There is no abdominal tenderness.   Musculoskeletal:         General: Normal range of motion.      Cervical back: Normal range of motion and neck supple.     Neurological: She is alert and oriented to person, place, and time.   Skin: Skin is warm and dry.   Psychiatric: She has a normal mood and affect.          EKG     Interpreted by EM physician if performed:   Normal sinus rhythm. No ST elevation or depression.  No T-wave inversions.  Normal intervals.  No evidence of ischemia. Rate of 84. Unchanged when compared to 2/19/18.             LABS  Pertinent labs reviewed. (See chart for details)   Labs Reviewed   POCT URINE PREGNANCY         RADIOLOGY          Imaging Results              X-Ray Chest 1 View (Final result)  Result time 12/01/22 21:02:47      Final result by Fredo Kearney DO (12/01/22 21:02:47)                   Impression:      No acute abnormality.      Electronically signed by: Fredo Kearney  Date:    12/01/2022  Time:    21:02               Narrative:    EXAMINATION:  XR CHEST 1 VIEW    CLINICAL HISTORY:  Chest pain, unspecified    TECHNIQUE:  Single frontal view of the chest was performed.    COMPARISON:  None    FINDINGS:  The lungs are well expanded and clear. No focal opacities are seen. The pleural spaces are clear.    The cardiac silhouette is unremarkable.    The visualized osseous structures are unremarkable.                                        PROCEDURES    Procedures      ED COURSE & MEDICAL DECISION MAKING    Pertinent Labs & Imaging studies reviewed. (See chart for details and specific orders.)          Melissa Herrera is a 27 y.o. female presents for evaluation after 2 episodes in the last day of chest discomfort lasting each for a  minute at a time and associated with heavy lifting.  In review of records it does not appear that she currently has diabetes or hyperlipidemia based on most recent lab work and from a cardiac standpoint is overall low risk.  I do not believe the patient has a pulmonary embolus and I did not order a d-dimer because the patient has a low pretest probability and is negative by the PERC rule.  The patient is < 50 year old, heart rate is < 100, oxygen saturation is > 94%, no prior history of dvt or pe, no recent trauma or surgery, no hemoptysis, no exogenous estrogens and no clinical signs of dvt. EKG is reassuring and she is currently asymptomatic.  Chest x-ray acutely unremarkable.  No concern for underlying infection at this time and overall suspicion for ACS is very low.  Advised close primary care follow-up or to return for continued or worsening symptoms do not feel that she requires additional lab evaluation at this time.  Plan of care discussed with patient who was agreeable.  Questions addressed and patient stable at time of discharge.        Medications - No data to display           FINAL IMPRESSION    Final diagnoses:  [R07.9] Chest pain       DISPOSITION  Patient discharged in stable condition        ED Prescriptions    None       Follow-up Information       Follow up With Specialties Details Why Contact Info    Kathleen Rashid MD Internal Medicine Schedule an appointment as soon as possible for a visit   [address] [address]  [address]  [facility] [address] [phone]                DISCLAIMER: This note was prepared with [employer] voice recognition transcription software. Garbled syntax, mangled pronouns, and other bizarre constructions may be attributed to that software system.      DO Natali Gardiner DO  12/02/22 0546       Natali Cedeño DO  05/09/23 0716

## 2022-12-02 NOTE — ED NOTES
Pt seen and evaluated by Dr. Cedeño in waiting area. Pt aaox4. Neruo intact. Resp even and unlabored. Nadn. Pt dc home at this time. Pt ambulated with steady gait. Nadn. Pt dc'd by Dr. Cedeño

## 2022-12-20 ENCOUNTER — LAB VISIT (OUTPATIENT)
Dept: LAB | Facility: HOSPITAL | Age: 27
End: 2022-12-20
Attending: INTERNAL MEDICINE
Payer: MEDICAID

## 2022-12-20 DIAGNOSIS — D64.9 ANEMIA, UNSPECIFIED: ICD-10-CM

## 2022-12-20 DIAGNOSIS — Z11.3 SCREENING EXAMINATION FOR VENEREAL DISEASE: ICD-10-CM

## 2022-12-20 DIAGNOSIS — R73.9 HYPERGLYCEMIA: ICD-10-CM

## 2022-12-20 DIAGNOSIS — Z11.59 SCREENING EXAMINATION FOR POLIOMYELITIS: ICD-10-CM

## 2022-12-20 DIAGNOSIS — E78.2 MIXED HYPERLIPIDEMIA: Primary | ICD-10-CM

## 2022-12-20 DIAGNOSIS — E53.8 VITAMIN B12 DEFICIENCY (NON ANEMIC): ICD-10-CM

## 2022-12-20 DIAGNOSIS — M15.9 GENERALIZED OSTEOARTHROSIS, INVOLVING MULTIPLE SITES: ICD-10-CM

## 2022-12-20 LAB
ALBUMIN SERPL BCP-MCNC: 3 G/DL (ref 3.5–5.2)
ALP SERPL-CCNC: 119 U/L (ref 55–135)
ALT SERPL W/O P-5'-P-CCNC: 13 U/L (ref 10–44)
ANION GAP SERPL CALC-SCNC: 13 MMOL/L (ref 8–16)
AST SERPL-CCNC: 20 U/L (ref 10–40)
BASOPHILS # BLD AUTO: 0.04 K/UL (ref 0–0.2)
BASOPHILS NFR BLD: 0.6 % (ref 0–1.9)
BILIRUB SERPL-MCNC: 0.2 MG/DL (ref 0.1–1)
BUN SERPL-MCNC: 12 MG/DL (ref 6–20)
CALCIUM SERPL-MCNC: 8.8 MG/DL (ref 8.7–10.5)
CHLORIDE SERPL-SCNC: 106 MMOL/L (ref 95–110)
CHOLEST SERPL-MCNC: 134 MG/DL (ref 120–199)
CHOLEST/HDLC SERPL: 3.4 {RATIO} (ref 2–5)
CO2 SERPL-SCNC: 20 MMOL/L (ref 23–29)
CREAT SERPL-MCNC: 0.8 MG/DL (ref 0.5–1.4)
DIFFERENTIAL METHOD: ABNORMAL
EOSINOPHIL # BLD AUTO: 0.1 K/UL (ref 0–0.5)
EOSINOPHIL NFR BLD: 2.2 % (ref 0–8)
ERYTHROCYTE [DISTWIDTH] IN BLOOD BY AUTOMATED COUNT: 15.9 % (ref 11.5–14.5)
EST. GFR  (NO RACE VARIABLE): >60 ML/MIN/1.73 M^2
ESTIMATED AVG GLUCOSE: 128 MG/DL (ref 68–131)
FERRITIN SERPL-MCNC: 10 NG/ML (ref 20–300)
GLUCOSE SERPL-MCNC: 94 MG/DL (ref 70–110)
HBA1C MFR BLD: 6.1 % (ref 4–5.6)
HCT VFR BLD AUTO: 33.7 % (ref 37–48.5)
HCV AB SERPL QL IA: NORMAL
HDLC SERPL-MCNC: 39 MG/DL (ref 40–75)
HDLC SERPL: 29.1 % (ref 20–50)
HGB BLD-MCNC: 10.3 G/DL (ref 12–16)
HIV 1+2 AB+HIV1 P24 AG SERPL QL IA: NORMAL
IMM GRANULOCYTES # BLD AUTO: 0.01 K/UL (ref 0–0.04)
IMM GRANULOCYTES NFR BLD AUTO: 0.2 % (ref 0–0.5)
IRON SERPL-MCNC: 20 UG/DL (ref 30–160)
LDLC SERPL CALC-MCNC: 84.6 MG/DL (ref 63–159)
LYMPHOCYTES # BLD AUTO: 2.4 K/UL (ref 1–4.8)
LYMPHOCYTES NFR BLD: 37.6 % (ref 18–48)
MCH RBC QN AUTO: 22.2 PG (ref 27–31)
MCHC RBC AUTO-ENTMCNC: 30.6 G/DL (ref 32–36)
MCV RBC AUTO: 73 FL (ref 82–98)
MONOCYTES # BLD AUTO: 0.5 K/UL (ref 0.3–1)
MONOCYTES NFR BLD: 8.2 % (ref 4–15)
NEUTROPHILS # BLD AUTO: 3.3 K/UL (ref 1.8–7.7)
NEUTROPHILS NFR BLD: 51.2 % (ref 38–73)
NONHDLC SERPL-MCNC: 95 MG/DL
NRBC BLD-RTO: 0 /100 WBC
PLATELET # BLD AUTO: 527 K/UL (ref 150–450)
PMV BLD AUTO: 9.7 FL (ref 9.2–12.9)
POTASSIUM SERPL-SCNC: 4.1 MMOL/L (ref 3.5–5.1)
PROT SERPL-MCNC: 7.5 G/DL (ref 6–8.4)
RBC # BLD AUTO: 4.64 M/UL (ref 4–5.4)
SODIUM SERPL-SCNC: 139 MMOL/L (ref 136–145)
T4 FREE SERPL-MCNC: 0.97 NG/DL (ref 0.71–1.51)
TRIGL SERPL-MCNC: 52 MG/DL (ref 30–150)
TSH SERPL DL<=0.005 MIU/L-ACNC: 1.51 UIU/ML (ref 0.4–4)
URATE SERPL-MCNC: 4.7 MG/DL (ref 2.4–5.7)
WBC # BLD AUTO: 6.44 K/UL (ref 3.9–12.7)

## 2022-12-20 PROCEDURE — 84550 ASSAY OF BLOOD/URIC ACID: CPT | Performed by: INTERNAL MEDICINE

## 2022-12-20 PROCEDURE — 84443 ASSAY THYROID STIM HORMONE: CPT | Performed by: INTERNAL MEDICINE

## 2022-12-20 PROCEDURE — 86696 HERPES SIMPLEX TYPE 2 TEST: CPT | Performed by: INTERNAL MEDICINE

## 2022-12-20 PROCEDURE — 82607 VITAMIN B-12: CPT | Performed by: INTERNAL MEDICINE

## 2022-12-20 PROCEDURE — 82728 ASSAY OF FERRITIN: CPT | Performed by: INTERNAL MEDICINE

## 2022-12-20 PROCEDURE — 83036 HEMOGLOBIN GLYCOSYLATED A1C: CPT | Performed by: INTERNAL MEDICINE

## 2022-12-20 PROCEDURE — 87340 HEPATITIS B SURFACE AG IA: CPT | Performed by: INTERNAL MEDICINE

## 2022-12-20 PROCEDURE — 80053 COMPREHEN METABOLIC PANEL: CPT | Performed by: INTERNAL MEDICINE

## 2022-12-20 PROCEDURE — 87389 HIV-1 AG W/HIV-1&-2 AB AG IA: CPT | Performed by: INTERNAL MEDICINE

## 2022-12-20 PROCEDURE — 84439 ASSAY OF FREE THYROXINE: CPT | Performed by: INTERNAL MEDICINE

## 2022-12-20 PROCEDURE — 36415 COLL VENOUS BLD VENIPUNCTURE: CPT | Performed by: INTERNAL MEDICINE

## 2022-12-20 PROCEDURE — 87521 HEPATITIS C PROBE&RVRS TRNSC: CPT | Performed by: INTERNAL MEDICINE

## 2022-12-20 PROCEDURE — 87591 N.GONORRHOEAE DNA AMP PROB: CPT | Performed by: INTERNAL MEDICINE

## 2022-12-20 PROCEDURE — 86803 HEPATITIS C AB TEST: CPT | Performed by: INTERNAL MEDICINE

## 2022-12-20 PROCEDURE — 86592 SYPHILIS TEST NON-TREP QUAL: CPT | Performed by: INTERNAL MEDICINE

## 2022-12-20 PROCEDURE — 80061 LIPID PANEL: CPT | Performed by: INTERNAL MEDICINE

## 2022-12-20 PROCEDURE — 87491 CHLMYD TRACH DNA AMP PROBE: CPT | Performed by: INTERNAL MEDICINE

## 2022-12-20 PROCEDURE — 85025 COMPLETE CBC W/AUTO DIFF WBC: CPT | Performed by: INTERNAL MEDICINE

## 2022-12-20 PROCEDURE — 84466 ASSAY OF TRANSFERRIN: CPT | Performed by: INTERNAL MEDICINE

## 2022-12-21 LAB
C TRACH DNA SPEC QL NAA+PROBE: NOT DETECTED
HBV SURFACE AG SERPL QL IA: NORMAL
IRON SERPL-MCNC: 20 UG/DL (ref 30–160)
N GONORRHOEA DNA SPEC QL NAA+PROBE: NOT DETECTED
RPR SER QL: NORMAL
SATURATED IRON: 5 % (ref 20–50)
TOTAL IRON BINDING CAPACITY: 410 UG/DL (ref 250–450)
TRANSFERRIN SERPL-MCNC: 277 MG/DL (ref 200–375)
VIT B12 SERPL-MCNC: 416 PG/ML (ref 210–950)

## 2022-12-22 LAB
HSV1 GG IGG SER-ACNC: 41.3 IV
HSV2 GG IGG SER-ACNC: 0.1 IV

## 2022-12-23 LAB — HCV RNA SERPL QL NAA+PROBE: NOT DETECTED

## 2025-06-21 ENCOUNTER — HOSPITAL ENCOUNTER (EMERGENCY)
Facility: HOSPITAL | Age: 30
Discharge: HOME | End: 2025-06-21
Attending: STUDENT IN AN ORGANIZED HEALTH CARE EDUCATION/TRAINING PROGRAM

## 2025-06-21 VITALS
RESPIRATION RATE: 20 BRPM | WEIGHT: 293 LBS | BODY MASS INDEX: 45.99 KG/M2 | DIASTOLIC BLOOD PRESSURE: 78 MMHG | HEIGHT: 67 IN | TEMPERATURE: 98 F | HEART RATE: 92 BPM | SYSTOLIC BLOOD PRESSURE: 125 MMHG | OXYGEN SATURATION: 97 %

## 2025-06-21 DIAGNOSIS — U07.1 COVID-19: Primary | ICD-10-CM

## 2025-06-21 DIAGNOSIS — J02.9 VIRAL PHARYNGITIS: ICD-10-CM

## 2025-06-21 LAB
INFLUENZA A MOLECULAR (OHS): NEGATIVE
INFLUENZA B MOLECULAR (OHS): NEGATIVE
SARS-COV-2 RDRP RESP QL NAA+PROBE: POSITIVE

## 2025-06-21 PROCEDURE — 99284 EMERGENCY DEPT VISIT MOD MDM: CPT | Mod: 25

## 2025-06-21 PROCEDURE — 87502 INFLUENZA DNA AMP PROBE: CPT | Performed by: STUDENT IN AN ORGANIZED HEALTH CARE EDUCATION/TRAINING PROGRAM

## 2025-06-21 PROCEDURE — 25000003 PHARM REV CODE 250: Performed by: STUDENT IN AN ORGANIZED HEALTH CARE EDUCATION/TRAINING PROGRAM

## 2025-06-21 PROCEDURE — 63600175 PHARM REV CODE 636 W HCPCS: Performed by: STUDENT IN AN ORGANIZED HEALTH CARE EDUCATION/TRAINING PROGRAM

## 2025-06-21 PROCEDURE — U0002 COVID-19 LAB TEST NON-CDC: HCPCS | Performed by: STUDENT IN AN ORGANIZED HEALTH CARE EDUCATION/TRAINING PROGRAM

## 2025-06-21 PROCEDURE — 96372 THER/PROPH/DIAG INJ SC/IM: CPT | Performed by: STUDENT IN AN ORGANIZED HEALTH CARE EDUCATION/TRAINING PROGRAM

## 2025-06-21 RX ORDER — BENZONATATE 200 MG/1
200 CAPSULE ORAL 3 TIMES DAILY PRN
Qty: 12 CAPSULE | Refills: 0 | Status: SHIPPED | OUTPATIENT
Start: 2025-06-21 | End: 2025-06-25

## 2025-06-21 RX ORDER — CETIRIZINE HYDROCHLORIDE 10 MG/1
20 TABLET ORAL DAILY
Qty: 10 TABLET | Refills: 0 | Status: SHIPPED | OUTPATIENT
Start: 2025-06-21 | End: 2025-06-26

## 2025-06-21 RX ORDER — IBUPROFEN 600 MG/1
600 TABLET, FILM COATED ORAL
Status: COMPLETED | OUTPATIENT
Start: 2025-06-21 | End: 2025-06-21

## 2025-06-21 RX ORDER — CETIRIZINE HYDROCHLORIDE 10 MG/1
10 TABLET ORAL
Status: COMPLETED | OUTPATIENT
Start: 2025-06-21 | End: 2025-06-21

## 2025-06-21 RX ORDER — DEXAMETHASONE SODIUM PHOSPHATE 4 MG/ML
8 INJECTION, SOLUTION INTRA-ARTICULAR; INTRALESIONAL; INTRAMUSCULAR; INTRAVENOUS; SOFT TISSUE
Status: COMPLETED | OUTPATIENT
Start: 2025-06-21 | End: 2025-06-21

## 2025-06-21 RX ADMIN — IBUPROFEN 600 MG: 600 TABLET ORAL at 09:06

## 2025-06-21 RX ADMIN — DEXAMETHASONE SODIUM PHOSPHATE 8 MG: 4 INJECTION, SOLUTION INTRA-ARTICULAR; INTRALESIONAL; INTRAMUSCULAR; INTRAVENOUS; SOFT TISSUE at 09:06

## 2025-06-21 RX ADMIN — CETIRIZINE HYDROCHLORIDE 10 MG: 10 TABLET, FILM COATED ORAL at 09:06

## 2025-06-21 NOTE — Clinical Note
"Melissa Johnson"Javier was seen and treated in our emergency department on 6/21/2025.  She may return to work on 06/23/2025.       If you have any questions or concerns, please don't hesitate to call.      CRYSTAL Quesada LPN"

## 2025-06-21 NOTE — Clinical Note
"Melissa"Marce Herrera was seen and treated in our emergency department on 6/21/2025.  She may return to work on 07/01/2025.       If you have any questions or concerns, please don't hesitate to call.      Fernando Mckenzie MD"

## 2025-06-22 NOTE — DISCHARGE INSTRUCTIONS
Follow up with your primary care doctor as needed.  Use ibuprofen and Tylenol for chills and fever.  Use cetirizine for nasal congestion.  The steroid shot you got today was 1 time to help with the sore throat.  Return to the emergency department if condition worsens in any way , such as chest pain or trouble breathing.      URI currently contagious for COVID. Current CDC guidelines recommends self quarantine not for any particular number of days, but until you have had no fever without any fever medications for 24 hours, and all your other symptoms are improving, and wearing mask in public for 10 days from start of symptoms, meaning 8 more days from now.

## 2025-06-22 NOTE — ED PROVIDER NOTES
Encounter Date: 6/21/2025       History     Chief Complaint   Patient presents with    General Illness     Having nasal congestion, cold sweats, body aches, mild headache for past 2 days. States took aspirin with some relief.     HPI   30-year-old female no pertinent medical history other than elevated BMI presents brought in by self through triage for 2 days of feelings of generalized unwell, myalgias, chills, nonproductive cough, sore throat, nasal congestion, and headache that is non thunderclap, not worst of life, no neck stiffness or photophobia or phonophobia.  Review of patient's allergies indicates:   Allergen Reactions    Grass pollen-coreen grass standard      Past Medical History:   Diagnosis Date    Asthma     Diabetes mellitus      No past surgical history on file.  No family history on file.  Social History[1]  Medical surgical family and social history reviewed  Review of Systems  Complete review of systems was conducted and was negative except as per HPI and as marked  Physical Exam     Initial Vitals [06/21/25 2027]   BP Pulse Resp Temp SpO2   125/78 92 20 98.4 °F (36.9 °C) 97 %      MAP       --         Physical Exam   Physical  General: Awake, alert, no acute distress  Head: Normocephalic, atraumatic  Neck: Trachea midline, full range of motion, no meningismus  ENT:     Slightly inflamed bilateral nasal turbinates. Mild pharyngeal erythema with bilateral tonsillar megaly without exudates.  No uvular deviation, trismus, masses, cervical chain lymphadenopathy, or any other evidence of acute invasive pharyngeal process.Atraumatic, Airway Patent  Cardio: Regular Rate, Regular Rhythm, Heart Sounds Normal, Capillary refill normal  Chest: Atraumatic, No respiratory distress no use of accessory muscles to breath, normal rate, sounds even and clear to auscultation  Abdomen: Soft, Nontender, no involuntary guarding, rigidity, or rebound.  Upper Ext: Atraumatic, Inspection normal, no swelling  Lower Ext:  Atraumatic, Inspection normal, no swelling  Neuro: No gross cranial nerve abnormality, no lateralization, no gross sensory or motor deficits  ED Course   Procedures  Labs Reviewed   SARS-COV-2 RNA AMPLIFICATION, QUAL - Abnormal       Result Value    SARS COV-2 Molecular Positive (*)    INFLUENZA A & B BY MOLECULAR - Normal    INFLUENZA A MOLECULAR Negative      INFLUENZA B MOLECULAR  Negative            Imaging Results    None          Medications   dexAMETHasone injection 8 mg (8 mg Intramuscular Given 6/21/25 2126)   ibuprofen tablet 600 mg (600 mg Oral Given 6/21/25 2126)   cetirizine tablet 10 mg (10 mg Oral Given 6/21/25 2126)     Medical Decision Making  Risk  OTC drugs.  Prescription drug management.      30-year-old female presents for feelings of sore throat, generalized unwell for 2 days.      Will evaluate for acute pathology requiring intervention with appropriate studies, treat symptomatically, and reassess.      Steroids x1 for pharyngitis     After history and exam findings are entirely consistent with viral process; they are not consistent with any acute invasive process such as meningitis, invasive oropharyngeotracheal process such as Peritonsillar abscess or retropharyngeal abscess, or invasive intraabdominal process such as appendicitis.     On swabs positive for COVID, which is clinically consistent with the presentation.  No severe disease risk factors other than body weight,  shared decision-making implemented and we agreed  to forego Paxlovid.    All studies reviewed,. Diagnosis, use of prescription medications, need for follow-up regarding visit today, need to call for follow-up, expected course, and return precautions were all discussed at length in my traditional manner to which patient verbalized understanding and agrees and all questions were answered. Patient is well appearing and ambulatory at time of discharge.              Patient seen in the Emergency department, which included  consideration and evaluation for life and limb threatening medical conditions including the history, exam, timely review and interpretation of studies.   All labs and imaging ordered for this encountered were reviewed and included in medical decision making.                  Clinical Impression:  Final diagnoses:  [U07.1] COVID-19 (Primary)  [J02.9] Viral pharyngitis          ED Disposition Condition    Discharge Stable          ED Prescriptions       Medication Sig Dispense Start Date End Date Auth. Provider    cetirizine (ZYRTEC) 10 MG tablet Take 2 tablets (20 mg total) by mouth once daily. for 5 days 10 tablet 6/21/2025 6/26/2025 Fernando Mckenzie MD    benzonatate (TESSALON) 200 MG capsule Take 1 capsule (200 mg total) by mouth 3 (three) times daily as needed. 12 capsule 6/21/2025 6/25/2025 Fernando Mckenzie MD          Follow-up Information    None                [1]   Social History  Tobacco Use    Smoking status: Never    Smokeless tobacco: Never   Substance Use Topics    Alcohol use: No    Drug use: No        Fernando Mckenzie MD  06/21/25 [id number]

## 2025-07-10 ENCOUNTER — HOSPITAL ENCOUNTER (EMERGENCY)
Facility: HOSPITAL | Age: 30
Discharge: HOME | End: 2025-07-10
Attending: EMERGENCY MEDICINE
Payer: COMMERCIAL

## 2025-07-10 VITALS
WEIGHT: 293 LBS | SYSTOLIC BLOOD PRESSURE: 127 MMHG | TEMPERATURE: 99 F | BODY MASS INDEX: 45.99 KG/M2 | OXYGEN SATURATION: 98 % | RESPIRATION RATE: 18 BRPM | HEART RATE: 99 BPM | HEIGHT: 67 IN | DIASTOLIC BLOOD PRESSURE: 95 MMHG

## 2025-07-10 DIAGNOSIS — M65.20 CALCIFIC TENDONITIS: Primary | ICD-10-CM

## 2025-07-10 DIAGNOSIS — M25.512 LEFT SHOULDER PAIN: ICD-10-CM

## 2025-07-10 LAB
B-HCG UR QL: NEGATIVE
CTP QC/QA: YES

## 2025-07-10 PROCEDURE — 81025 URINE PREGNANCY TEST: CPT | Performed by: NURSE PRACTITIONER

## 2025-07-10 PROCEDURE — 96372 THER/PROPH/DIAG INJ SC/IM: CPT | Performed by: PHYSICIAN ASSISTANT

## 2025-07-10 PROCEDURE — 63600175 PHARM REV CODE 636 W HCPCS: Mod: JZ,TB | Performed by: PHYSICIAN ASSISTANT

## 2025-07-10 PROCEDURE — 99284 EMERGENCY DEPT VISIT MOD MDM: CPT | Mod: 25

## 2025-07-10 RX ORDER — NAPROXEN 500 MG/1
500 TABLET ORAL 2 TIMES DAILY WITH MEALS
Qty: 30 TABLET | Refills: 0 | Status: SHIPPED | OUTPATIENT
Start: 2025-07-10

## 2025-07-10 RX ORDER — KETOROLAC TROMETHAMINE 30 MG/ML
30 INJECTION, SOLUTION INTRAMUSCULAR; INTRAVENOUS
Status: COMPLETED | OUTPATIENT
Start: 2025-07-10 | End: 2025-07-10

## 2025-07-10 RX ADMIN — KETOROLAC TROMETHAMINE 30 MG: 30 INJECTION, SOLUTION INTRAMUSCULAR; INTRAVENOUS at 07:07

## 2025-07-10 NOTE — ED NOTES
Patient is complaining of left shoulder pain for days.  Patient denies any injury or trauma.  Patient can not lift arm, has limited range of motion.  Patient rates pain 10/10.

## 2025-07-10 NOTE — FIRST PROVIDER EVALUATION
Emergency Department TeleTriage Encounter Note      CHIEF COMPLAINT    Chief Complaint   Patient presents with    Shoulder Pain     Nontraumatic left shoulder pain that started ~2 days ago. States worsens with manipulation of joint, tender to touch. CMS intact in the affected extremity.       VITAL SIGNS   Initial Vitals [07/10/25 1825]   BP Pulse Resp Temp SpO2   132/86 110 18 98.6 °F (37 °C) --      MAP       --            ALLERGIES    Review of patient's allergies indicates:   Allergen Reactions    Grass pollen-june grass standard        PROVIDER TRIAGE NOTE  This is a teletriage evaluation of a 30 y.o. female presenting to the ED complaining of nontraumatic left shoulder pain. No trauma.    Alert, no distress.     Initial orders will be placed and care will be transferred to an alternate provider when patient is roomed for a full evaluation. Any additional orders and the final disposition will be determined by that provider.         ORDERS  Labs Reviewed   POCT URINE PREGNANCY       ED Orders (720h ago, onward)      Start Ordered     Status Ordering Provider    07/10/25 1832 07/10/25 1831  POCT urine pregnancy  Once         Ordered MEI BOLTON              Virtual Visit Note: The provider triage portion of this emergency department evaluation and documentation was performed via [employer], a HIPAA-compliant telemedicine application, in concert with a tele-presenter in the room. A face to face patient evaluation with one of my colleagues will occur once the patient is placed in an emergency department room.      DISCLAIMER: This note was prepared with [email or web address] voice recognition transcription software. Garbled syntax, mangled pronouns, and other bizarre constructions may be attributed to that software system.

## 2025-07-10 NOTE — Clinical Note
"Melissa"Marce Herrera was seen and treated in our emergency department on 7/10/2025.  She may return to work on 07/12/2025.       If you have any questions or concerns, please don't hesitate to call.      Brigido Torres PA-C"

## 2025-07-11 NOTE — ED PROVIDER NOTES
Encounter Date: 7/10/2025       History     Chief Complaint   Patient presents with    Shoulder Pain     Nontraumatic left shoulder pain that started ~2 days ago. States worsens with manipulation of joint, tender to touch. CMS intact in the affected extremity.      30 year old female presents to ED with concern of left-sided shoulder pain that began 3-4 days ago.  Pain symptoms were initially mild but progressively worsening with current pain described as sharp, worse with touch or movement, severity 8/10.  No numbness or focal weakness.  Denying any associated chest pain, cough, shortness breath.  No other acute complaints at this time    The history is provided by the patient.     Review of patient's allergies indicates:   Allergen Reactions    Grass pollen-coreen grass standard      Past Medical History:   Diagnosis Date    Asthma     Diabetes mellitus      History reviewed. No pertinent surgical history.  No family history on file.  Social History[1]  Review of Systems   Respiratory:  Negative for cough and shortness of breath.    Cardiovascular:  Negative for chest pain.   Musculoskeletal:  Positive for arthralgias.   Neurological:  Negative for weakness and numbness.       Physical Exam     Initial Vitals [07/10/25 1825]   BP Pulse Resp Temp SpO2   132/86 110 18 98.6 °F (37 °C) --      MAP       --         Physical Exam    Vitals reviewed.  Constitutional: She appears well-developed and well-nourished. She is active. She does not have a sickly appearance. She does not appear ill. No distress.   HENT:   Head: Normocephalic and atraumatic.   Neck:   Normal range of motion.  Cardiovascular:  Normal rate and regular rhythm.           Pulmonary/Chest: Breath sounds normal.   Musculoskeletal:      Cervical back: Normal range of motion. No spinous process tenderness or muscular tenderness.      Comments:  Generalized tenderness left shoulder with no palpable deformities.  ROM limited secondary to pain symptoms.  No  additional tenderness reported throughout remaining left upper extremity.  Sensations intact.  Radial pulse intact.     Neurological: She is alert. GCS eye subscore is 4. GCS verbal subscore is 5. GCS motor subscore is 6.   Skin: Skin is warm and dry.   Psychiatric: She has a normal mood and affect. Her speech is normal and behavior is normal.         ED Course   Procedures  Labs Reviewed   POCT URINE PREGNANCY       Result Value    POC Preg Test, Ur Negative       Acceptable Yes            Imaging Results              X-Ray Shoulder Trauma Left (Final result)  Result time 07/10/25 20:40:21      Final result by Fredo Kearney DO (07/10/25 20:40:21)                   Impression:      No acute osseous abnormality.    Calcific tendinosis.      Electronically signed by: Fredo Kearney  Date:    07/10/2025  Time:    20:40               Narrative:    EXAMINATION:  XR SHOULDER TRAUMA 3 VIEW LEFT    CLINICAL HISTORY:  Pain in left shoulder    TECHNIQUE:  Three views of the left shoulder were performed.    COMPARISON  None    FINDINGS:  There is no acute fracture or dislocation of the shoulder.  The humeral head is well seated in the glenoid.  There is a well corticated osseous density adjacent to the greater tuberosity, which can be seen with calcific tendinosis.  Remaining osseous structures are intact.  Joint spaces are preserved.                                       Medications   ketorolac injection 30 mg (30 mg Intramuscular Given 7/10/25 1946)     Medical Decision Making    Patient presents with concern of left-sided shoulder pain that has progressively worsened over past few days.  No injury or trauma. No cardiac complaints.  Afebrile.  Patient in no distress on exam    DDx:  Including but not limited to   Strain, sprain, contusing, dislocation, fracture, calcific tendinosis, RTC injury, impingement    Amount and/or Complexity of Data Reviewed  Radiology: ordered. Decision-making details documented  in ED Course.    Risk  Prescription drug management.               ED Course as of 07/10/25 2105   Thu Jul 10, 2025   2102 X-Ray Shoulder Trauma Left   X-ray left shoulder per my interpretation and Radiology review is significant for calcific tendinosis.  No acute fracture or dislocation per Radiology review [KS]   2102  Results were discussed with patient.  She is reporting improvement of pain symptoms with ED intervention.  Will plan to continue with supportive care.  Prescription written for naproxen.  Encouraged close PCP follow-up.  ED return precautions were discussed.  Patient states understanding and agrees with plan [KS]      ED Course User Index  [KS] Brigido Torres PA-C                           Clinical Impression:  Final diagnoses:  [M25.512] Left shoulder pain  [M65.20] Calcific tendonitis (Primary)          ED Disposition Condition    Discharge Stable          ED Prescriptions       Medication Sig Dispense Start Date End Date Auth. Provider    naproxen (NAPROSYN) 500 MG tablet Take 1 tablet (500 mg total) by mouth 2 (two) times daily with meals. 30 tablet 7/10/2025 -- Brigido Torres PA-C          Follow-up Information       Follow up With Specialties Details Why Contact Info    Kathleen Rashid MD Internal Medicine   [address]  [address]  [facility] [address]  [phone]                     [1]   Social History  Tobacco Use    Smoking status: Never    Smokeless tobacco: Never   Substance Use Topics    Alcohol use: No    Drug use: No        Brigido Torres PA-C  07/10/25 2105

## 2025-07-11 NOTE — DISCHARGE INSTRUCTIONS

## (undated) DEVICE — ELECTRODE REM PLYHSV RETURN 9

## (undated) DEVICE — SUT MCRYL PLUS 4-0 PS2 27IN

## (undated) DEVICE — DISSECTOR 5MM ENDOPATH

## (undated) DEVICE — MANIFOLD 4 PORT

## (undated) DEVICE — GLOVE SURG BIOGEL LATEX SZ 7.5

## (undated) DEVICE — TROCAR KII FIOS 11MM X 100MM

## (undated) DEVICE — APPLIER CLIP ENDO MED/LG 10MM

## (undated) DEVICE — TROCAR KII FIOS 5MM X 100MM

## (undated) DEVICE — DRAPE INCISE IOBAN 2 23X23IN

## (undated) DEVICE — SOL IRR NACL .9% 3000ML

## (undated) DEVICE — SUT 0 VICRYL / UR6 (J603)

## (undated) DEVICE — SCISSOR 5MMX35CM DIRECT DRIVE

## (undated) DEVICE — SEE MEDLINE ITEM 152622

## (undated) DEVICE — IRRIGATOR ENDOSCOPY DISP.

## (undated) DEVICE — SEE MEDLINE ITEM 156952

## (undated) DEVICE — SUT MONOCRYL 3-0 PS-2 UND

## (undated) DEVICE — NDL HYPDRM 23X15

## (undated) DEVICE — ADHESIVE DERMABOND ADVANCED

## (undated) DEVICE — BAG TISS RETRV MONARCH 10MM